# Patient Record
Sex: MALE | Race: WHITE | HISPANIC OR LATINO | URBAN - METROPOLITAN AREA
[De-identification: names, ages, dates, MRNs, and addresses within clinical notes are randomized per-mention and may not be internally consistent; named-entity substitution may affect disease eponyms.]

---

## 2020-11-22 ENCOUNTER — OUTPATIENT (OUTPATIENT)
Dept: OUTPATIENT SERVICES | Facility: HOSPITAL | Age: 75
LOS: 1 days | End: 2020-11-22
Payer: MEDICARE

## 2020-11-22 DIAGNOSIS — Z11.59 ENCOUNTER FOR SCREENING FOR OTHER VIRAL DISEASES: ICD-10-CM

## 2020-11-22 PROCEDURE — U0003: CPT

## 2020-11-23 DIAGNOSIS — Z11.59 ENCOUNTER FOR SCREENING FOR OTHER VIRAL DISEASES: ICD-10-CM

## 2020-11-23 LAB — SARS-COV-2 RNA SPEC QL NAA+PROBE: SIGNIFICANT CHANGE UP

## 2023-08-15 ENCOUNTER — EMERGENCY (EMERGENCY)
Facility: HOSPITAL | Age: 78
LOS: 0 days | Discharge: HOME | End: 2023-08-15
Attending: EMERGENCY MEDICINE
Payer: MEDICARE

## 2023-08-15 VITALS
RESPIRATION RATE: 18 BRPM | HEART RATE: 72 BPM | TEMPERATURE: 98 F | OXYGEN SATURATION: 100 % | DIASTOLIC BLOOD PRESSURE: 82 MMHG | SYSTOLIC BLOOD PRESSURE: 133 MMHG

## 2023-08-15 VITALS
DIASTOLIC BLOOD PRESSURE: 80 MMHG | HEART RATE: 65 BPM | RESPIRATION RATE: 13 BRPM | SYSTOLIC BLOOD PRESSURE: 133 MMHG | OXYGEN SATURATION: 100 % | TEMPERATURE: 98 F

## 2023-08-15 DIAGNOSIS — T82.838A HEMORRHAGE DUE TO VASCULAR PROSTHETIC DEVICES, IMPLANTS AND GRAFTS, INITIAL ENCOUNTER: ICD-10-CM

## 2023-08-15 DIAGNOSIS — Z95.1 PRESENCE OF AORTOCORONARY BYPASS GRAFT: Chronic | ICD-10-CM
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
## 2023-08-15 DIAGNOSIS — Z95.1 PRESENCE OF AORTOCORONARY BYPASS GRAFT: ICD-10-CM

## 2023-08-15 DIAGNOSIS — I25.10 ATHEROSCLEROTIC HEART DISEASE OF NATIVE CORONARY ARTERY WITHOUT ANGINA PECTORIS: ICD-10-CM
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
## 2023-08-15 LAB
ALBUMIN SERPL ELPH-MCNC: 3.5 G/DL — SIGNIFICANT CHANGE UP (ref 3.3–5)
ALP SERPL-CCNC: 40 U/L — SIGNIFICANT CHANGE UP (ref 40–120)
ALT FLD-CCNC: 33 U/L — SIGNIFICANT CHANGE UP (ref 12–78)
ANION GAP SERPL CALC-SCNC: 4 MMOL/L — LOW (ref 5–17)
APPEARANCE UR: CLEAR — SIGNIFICANT CHANGE UP
APTT BLD: 26.9 SEC — SIGNIFICANT CHANGE UP (ref 24.5–35.6)
AST SERPL-CCNC: 31 U/L — SIGNIFICANT CHANGE UP (ref 15–37)
BASOPHILS # BLD AUTO: 0.05 K/UL — SIGNIFICANT CHANGE UP (ref 0–0.2)
BASOPHILS NFR BLD AUTO: 1 % — SIGNIFICANT CHANGE UP (ref 0–2)
BILIRUB SERPL-MCNC: 0.5 MG/DL — SIGNIFICANT CHANGE UP (ref 0.2–1.2)
BILIRUB UR-MCNC: NEGATIVE — SIGNIFICANT CHANGE UP
BLD GP AB SCN SERPL QL: SIGNIFICANT CHANGE UP
BUN SERPL-MCNC: 16 MG/DL — SIGNIFICANT CHANGE UP (ref 7–23)
CALCIUM SERPL-MCNC: 8.6 MG/DL — SIGNIFICANT CHANGE UP (ref 8.5–10.1)
CHLORIDE SERPL-SCNC: 109 MMOL/L — HIGH (ref 96–108)
CO2 SERPL-SCNC: 25 MMOL/L — SIGNIFICANT CHANGE UP (ref 22–31)
COLOR SPEC: YELLOW — SIGNIFICANT CHANGE UP
CREAT SERPL-MCNC: 0.85 MG/DL — SIGNIFICANT CHANGE UP (ref 0.5–1.3)
DIFF PNL FLD: NEGATIVE — SIGNIFICANT CHANGE UP
EGFR: 90 ML/MIN/1.73M2 — SIGNIFICANT CHANGE UP
EOSINOPHIL # BLD AUTO: 0.26 K/UL — SIGNIFICANT CHANGE UP (ref 0–0.5)
EOSINOPHIL NFR BLD AUTO: 5.2 % — SIGNIFICANT CHANGE UP (ref 0–6)
GLUCOSE SERPL-MCNC: 102 MG/DL — HIGH (ref 70–99)
GLUCOSE UR QL: NEGATIVE — SIGNIFICANT CHANGE UP
HCT VFR BLD CALC: 42.9 % — SIGNIFICANT CHANGE UP (ref 39–50)
HGB BLD-MCNC: 15.1 G/DL — SIGNIFICANT CHANGE UP (ref 13–17)
IMM GRANULOCYTES NFR BLD AUTO: 0.2 % — SIGNIFICANT CHANGE UP (ref 0–0.9)
INR BLD: 1.03 RATIO — SIGNIFICANT CHANGE UP (ref 0.85–1.18)
KETONES UR-MCNC: NEGATIVE — SIGNIFICANT CHANGE UP
LEUKOCYTE ESTERASE UR-ACNC: NEGATIVE — SIGNIFICANT CHANGE UP
LYMPHOCYTES # BLD AUTO: 1.47 K/UL — SIGNIFICANT CHANGE UP (ref 1–3.3)
LYMPHOCYTES # BLD AUTO: 29.6 % — SIGNIFICANT CHANGE UP (ref 13–44)
MCHC RBC-ENTMCNC: 30.8 PG — SIGNIFICANT CHANGE UP (ref 27–34)
MCHC RBC-ENTMCNC: 35.2 GM/DL — SIGNIFICANT CHANGE UP (ref 32–36)
MCV RBC AUTO: 87.6 FL — SIGNIFICANT CHANGE UP (ref 80–100)
MONOCYTES # BLD AUTO: 0.68 K/UL — SIGNIFICANT CHANGE UP (ref 0–0.9)
MONOCYTES NFR BLD AUTO: 13.7 % — SIGNIFICANT CHANGE UP (ref 2–14)
NEUTROPHILS # BLD AUTO: 2.49 K/UL — SIGNIFICANT CHANGE UP (ref 1.8–7.4)
NEUTROPHILS NFR BLD AUTO: 50.3 % — SIGNIFICANT CHANGE UP (ref 43–77)
NITRITE UR-MCNC: NEGATIVE — SIGNIFICANT CHANGE UP
PH UR: 7 — SIGNIFICANT CHANGE UP (ref 5–8)
PLATELET # BLD AUTO: 201 K/UL — SIGNIFICANT CHANGE UP (ref 150–400)
POTASSIUM SERPL-MCNC: 4 MMOL/L — SIGNIFICANT CHANGE UP (ref 3.5–5.3)
POTASSIUM SERPL-SCNC: 4 MMOL/L — SIGNIFICANT CHANGE UP (ref 3.5–5.3)
PROT SERPL-MCNC: 6.1 GM/DL — SIGNIFICANT CHANGE UP (ref 6–8.3)
PROT UR-MCNC: NEGATIVE — SIGNIFICANT CHANGE UP
PROTHROM AB SERPL-ACNC: 11.6 SEC — SIGNIFICANT CHANGE UP (ref 9.5–13)
RBC # BLD: 4.9 M/UL — SIGNIFICANT CHANGE UP (ref 4.2–5.8)
RBC # FLD: 15.5 % — HIGH (ref 10.3–14.5)
SODIUM SERPL-SCNC: 138 MMOL/L — SIGNIFICANT CHANGE UP (ref 135–145)
SP GR SPEC: 1.01 — SIGNIFICANT CHANGE UP (ref 1.01–1.02)
UROBILINOGEN FLD QL: NEGATIVE — SIGNIFICANT CHANGE UP
WBC # BLD: 4.96 K/UL — SIGNIFICANT CHANGE UP (ref 3.8–10.5)
WBC # FLD AUTO: 4.96 K/UL — SIGNIFICANT CHANGE UP (ref 3.8–10.5)

## 2023-08-15 PROCEDURE — 85610 PROTHROMBIN TIME: CPT

## 2023-08-15 PROCEDURE — 99284 EMERGENCY DEPT VISIT MOD MDM: CPT

## 2023-08-15 PROCEDURE — 93926 LOWER EXTREMITY STUDY: CPT | Mod: 26,RT

## 2023-08-15 PROCEDURE — 93926 LOWER EXTREMITY STUDY: CPT | Mod: RT

## 2023-08-15 PROCEDURE — 85730 THROMBOPLASTIN TIME PARTIAL: CPT

## 2023-08-15 PROCEDURE — 86901 BLOOD TYPING SEROLOGIC RH(D): CPT

## 2023-08-15 PROCEDURE — 99284 EMERGENCY DEPT VISIT MOD MDM: CPT | Mod: 25

## 2023-08-15 PROCEDURE — 36415 COLL VENOUS BLD VENIPUNCTURE: CPT

## 2023-08-15 PROCEDURE — 86900 BLOOD TYPING SEROLOGIC ABO: CPT

## 2023-08-15 PROCEDURE — 80053 COMPREHEN METABOLIC PANEL: CPT

## 2023-08-15 PROCEDURE — 81003 URINALYSIS AUTO W/O SCOPE: CPT

## 2023-08-15 PROCEDURE — 86850 RBC ANTIBODY SCREEN: CPT

## 2023-08-15 PROCEDURE — 85025 COMPLETE CBC W/AUTO DIFF WBC: CPT

## 2023-08-15 NOTE — ED ADULT NURSE NOTE - AS PAIN REST
0 (no pain/absence of nonverbal indicators of pain) Propranolol Pregnancy And Lactation Text: This medication is Pregnancy Category C and it isn't known if it is safe during pregnancy. It is excreted in breast milk.

## 2023-08-15 NOTE — ED PROVIDER NOTE - OBJECTIVE STATEMENT
78M hx CAD s/p CABGx4 in March, cardiac cath with stents x2 done yesterday at [facility] in [facility] (cath by Dr Larson), p/w bleeding from R groin cath site. Pt reports noticed bleeding around midnight, called service who rec pressure to area and to come to ED if bleeding returned. Bleeding returned this am, not brisk per pt. 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 No truama, denies CP/SOB

## 2023-08-15 NOTE — ED PROVIDER NOTE - PATIENT PORTAL LINK FT
You can access the FollowMyHealth Patient Portal offered by [facility] by registering at the following website: [email or web address]. By joining [facility]’s FollowMyHealth portal, you will also be able to view your health information using other applications (apps) compatible with our system.

## 2023-08-15 NOTE — ED ADULT NURSE NOTE - NSFALLHARMRISKINTERV_ED_ALL_ED

## 2023-08-15 NOTE — ED ADULT NURSE NOTE - OBJECTIVE STATEMENT
Pt is AOx4 from home c/o bleeding at surgical site from right femoral catheterization yesterday at [facility] for two stent placement. Pt states he called cardiologist who instructed him to come to the ED if bleeding continued. Pt is able to move RLE and denies increase in numbness/tingling. Pt denies chest pain, sob, numbness/tingling, change in gait or blurred vision.

## 2023-08-15 NOTE — ED ADULT TRIAGE NOTE - CHIEF COMPLAINT QUOTE
pt presents to the ED for bleeding at the site of his R groin. as per pt he had cardiac catheterization for bypass yesterday. denies chest pain and SOB. pt reports the site on his R groin is still bleeding. no complaints of chest pain or SOB. pt is well appearing. A&Ox4. no further complaints

## 2023-08-15 NOTE — ED ADULT NURSE NOTE - CAS ELECT INFOMATION PROVIDED
Pt D/C instruction was given to patient. Pt verbally stated he understood instructions. VSS pt denies SOB, chest  pain. Pt refused wheelchair for discharge/DC instructions

## 2023-08-15 NOTE — ED PROVIDER NOTE - NSFOLLOWUPINSTRUCTIONS_ED_ALL_ED_FT
Return to the Emergency Department for worsening or persistent symptoms, and/or ANY NEW OR CONCERNING SYMPTOMS. If you have issues obtaining follow up, please call: [phone] or [phone]  to obtain a doctor or specialist who takes your insurance in your area.    Coronary Angiogram, Care After  This sheet gives you information about how to care for yourself after your procedure. Your health care provider may also give you more specific instructions. If you have problems or questions, contact your health care provider.    What can I expect after the procedure?  After the procedure, it is common to have:  Bruising and tenderness at the catheter insertion area.  A collection of blood (hematoma) at the insertion area. This may feel like a small lump under the skin.  Follow these instructions at home:  Insertion site care      Follow instructions from your health care provider about how to take care of your insertion site. Make sure you:  Wash your hands with soap and water before and after you change your bandage (dressing). If soap and water are not available, use hand .  Change your dressing as told by your health care provider.  Do not take baths, swim, or use a hot tub until your health care provider approves.  You may shower 24–48 hours after the procedure, or as told by your health care provider. To clean the insertion site:  Gently wash the area with plain soap and water.  Pat the area dry with a clean towel.  Do not rub the site. This may cause bleeding.  Keep the site clean and dry. Do not apply powder or lotion.  Check your insertion site every day for signs of infection. Check for:  Redness, swelling, or pain.  Fluid or blood.  Warmth.  Pus or a bad smell.  Activity    Do not drive for 24 hours if you were given a sedative during your procedure.  Rest as told by your health care provider. You may be asked to rest for 1–2 days.  Do not lift anything that is heavier than 10 lb (4.5 kg), or the limit that you are told, until your health care provider says that it is safe.  If your insertion site was in your leg, try to avoid stairs for a few days.  Return to your normal activities as told by your health care provider, usually in about a week. Ask your health care provider what activities are safe for you.  General instructions      If your insertion site starts bleeding, lie flat and put pressure on the site. If the bleeding does not stop, get help right away. This is a medical emergency.  Take over-the-counter and prescription medicines only as told by your health care provider.  Drink enough fluid to keep your urine pale yellow. This helps to flush the contrast dye from your body.  Keep all follow-up visits as told by your health care provider. This is important.  Contact a health care provider if:  You have a fever or chills.  You have redness, swelling, or pain around your insertion site.  You have fluid or blood coming from your insertion site.  Your insertion site feels warm to the touch.  You have pus or a bad smell coming from your insertion site.  You have more bruising around the insertion site.  Get help right away if:  You have problems in the insertion site:  You have severe pain, rapid swelling, or bleeding that does not stop when pressure is applied.  The insertion site becomes pale, cool, tingly, or numb.  You have chest pain.  You have trouble breathing.  You have a rash.  Any symptoms of a stroke. "BE FAST" is an easy way to remember the main warning signs:  B - Balance. Signs are dizziness, sudden trouble walking, or loss of balance.  E - Eyes. Signs are trouble seeing or a sudden change in how you see.  F - Face. Signs are sudden weakness or loss of feeling in the face, or the face or eyelid drooping on one side.  A - Arms. Signs are weakness or loss of feeling in an arm. This happens suddenly and usually on one side of the body.  S - Speech. Signs are sudden trouble speaking, slurred speech, or trouble understanding what people say.  T - Time. Time to call emergency services. Write down what time symptoms started.  You have other signs of a stroke, such as:  A sudden, severe headache with no known cause.  Nausea or vomiting.  Seizure.  These symptoms may represent a serious problem that is an emergency. Do not wait to see if the symptoms will go away. Get medical help right away. Call your local emergency services (911 in the [place] Do not drive yourself to the hospital.    Summary  It is common to have bruising and tenderness at the catheter insertion area.  Do not take baths, swim, or use a hot tub until your health care provider approves. You may shower 24–48 hours after the procedure or as told.  It is important to rest and drink plenty of fluids.  If the insertion site bleeds, lie flat and put pressure on the site. If the bleeding continues, get help right away. This is a medical emergency.  This information is not intended to replace advice given to you by your health care provider. Make sure you discuss any questions you have with your health care provider.

## 2023-10-14 ENCOUNTER — EMERGENCY (EMERGENCY)
Facility: HOSPITAL | Age: 78
LOS: 0 days | Discharge: HOME | End: 2023-10-14
Attending: STUDENT IN AN ORGANIZED HEALTH CARE EDUCATION/TRAINING PROGRAM
Payer: MEDICARE

## 2023-10-14 VITALS — HEIGHT: 74 IN | WEIGHT: 167.99 LBS

## 2023-10-14 VITALS
HEART RATE: 74 BPM | TEMPERATURE: 98 F | SYSTOLIC BLOOD PRESSURE: 130 MMHG | OXYGEN SATURATION: 100 % | DIASTOLIC BLOOD PRESSURE: 83 MMHG | RESPIRATION RATE: 19 BRPM

## 2023-10-14 DIAGNOSIS — Z95.1 PRESENCE OF AORTOCORONARY BYPASS GRAFT: Chronic | ICD-10-CM

## 2023-10-14 DIAGNOSIS — I25.10 ATHEROSCLEROTIC HEART DISEASE OF NATIVE CORONARY ARTERY WITHOUT ANGINA PECTORIS: ICD-10-CM

## 2023-10-14 DIAGNOSIS — R10.31 RIGHT LOWER QUADRANT PAIN: ICD-10-CM

## 2023-10-14 DIAGNOSIS — Z95.5 PRESENCE OF CORONARY ANGIOPLASTY IMPLANT AND GRAFT: ICD-10-CM

## 2023-10-14 DIAGNOSIS — Z95.1 PRESENCE OF AORTOCORONARY BYPASS GRAFT: ICD-10-CM

## 2023-10-14 LAB
ALBUMIN SERPL ELPH-MCNC: 3.7 G/DL — SIGNIFICANT CHANGE UP (ref 3.3–5)
ALP SERPL-CCNC: 54 U/L — SIGNIFICANT CHANGE UP (ref 40–120)
ALT FLD-CCNC: 35 U/L — SIGNIFICANT CHANGE UP (ref 12–78)
ANION GAP SERPL CALC-SCNC: 4 MMOL/L — LOW (ref 5–17)
AST SERPL-CCNC: 31 U/L — SIGNIFICANT CHANGE UP (ref 15–37)
BASOPHILS # BLD AUTO: 0.06 K/UL — SIGNIFICANT CHANGE UP (ref 0–0.2)
BASOPHILS NFR BLD AUTO: 0.9 % — SIGNIFICANT CHANGE UP (ref 0–2)
BILIRUB SERPL-MCNC: 0.6 MG/DL — SIGNIFICANT CHANGE UP (ref 0.2–1.2)
BUN SERPL-MCNC: 15 MG/DL — SIGNIFICANT CHANGE UP (ref 7–23)
CALCIUM SERPL-MCNC: 9.1 MG/DL — SIGNIFICANT CHANGE UP (ref 8.5–10.1)
CHLORIDE SERPL-SCNC: 106 MMOL/L — SIGNIFICANT CHANGE UP (ref 96–108)
CO2 SERPL-SCNC: 28 MMOL/L — SIGNIFICANT CHANGE UP (ref 22–31)
CREAT SERPL-MCNC: 0.88 MG/DL — SIGNIFICANT CHANGE UP (ref 0.5–1.3)
EGFR: 89 ML/MIN/1.73M2 — SIGNIFICANT CHANGE UP
EOSINOPHIL # BLD AUTO: 0.23 K/UL — SIGNIFICANT CHANGE UP (ref 0–0.5)
EOSINOPHIL NFR BLD AUTO: 3.4 % — SIGNIFICANT CHANGE UP (ref 0–6)
GLUCOSE SERPL-MCNC: 88 MG/DL — SIGNIFICANT CHANGE UP (ref 70–99)
HCT VFR BLD CALC: 45.8 % — SIGNIFICANT CHANGE UP (ref 39–50)
HGB BLD-MCNC: 15.7 G/DL — SIGNIFICANT CHANGE UP (ref 13–17)
IMM GRANULOCYTES NFR BLD AUTO: 0.3 % — SIGNIFICANT CHANGE UP (ref 0–0.9)
LYMPHOCYTES # BLD AUTO: 1.63 K/UL — SIGNIFICANT CHANGE UP (ref 1–3.3)
LYMPHOCYTES # BLD AUTO: 24.3 % — SIGNIFICANT CHANGE UP (ref 13–44)
MCHC RBC-ENTMCNC: 31.4 PG — SIGNIFICANT CHANGE UP (ref 27–34)
MCHC RBC-ENTMCNC: 34.3 GM/DL — SIGNIFICANT CHANGE UP (ref 32–36)
MCV RBC AUTO: 91.6 FL — SIGNIFICANT CHANGE UP (ref 80–100)
MONOCYTES # BLD AUTO: 0.65 K/UL — SIGNIFICANT CHANGE UP (ref 0–0.9)
MONOCYTES NFR BLD AUTO: 9.7 % — SIGNIFICANT CHANGE UP (ref 2–14)
NEUTROPHILS # BLD AUTO: 4.12 K/UL — SIGNIFICANT CHANGE UP (ref 1.8–7.4)
NEUTROPHILS NFR BLD AUTO: 61.4 % — SIGNIFICANT CHANGE UP (ref 43–77)
PLATELET # BLD AUTO: 219 K/UL — SIGNIFICANT CHANGE UP (ref 150–400)
POTASSIUM SERPL-MCNC: 4.1 MMOL/L — SIGNIFICANT CHANGE UP (ref 3.5–5.3)
POTASSIUM SERPL-SCNC: 4.1 MMOL/L — SIGNIFICANT CHANGE UP (ref 3.5–5.3)
PROT SERPL-MCNC: 6.8 GM/DL — SIGNIFICANT CHANGE UP (ref 6–8.3)
RBC # BLD: 5 M/UL — SIGNIFICANT CHANGE UP (ref 4.2–5.8)
RBC # FLD: 13.4 % — SIGNIFICANT CHANGE UP (ref 10.3–14.5)
SODIUM SERPL-SCNC: 138 MMOL/L — SIGNIFICANT CHANGE UP (ref 135–145)
WBC # BLD: 6.71 K/UL — SIGNIFICANT CHANGE UP (ref 3.8–10.5)
WBC # FLD AUTO: 6.71 K/UL — SIGNIFICANT CHANGE UP (ref 3.8–10.5)

## 2023-10-14 PROCEDURE — 99285 EMERGENCY DEPT VISIT HI MDM: CPT | Mod: FS

## 2023-10-14 PROCEDURE — 85025 COMPLETE CBC W/AUTO DIFF WBC: CPT

## 2023-10-14 PROCEDURE — 74174 CTA ABD&PLVS W/CONTRAST: CPT | Mod: MA

## 2023-10-14 PROCEDURE — 93926 LOWER EXTREMITY STUDY: CPT | Mod: RT

## 2023-10-14 PROCEDURE — 93926 LOWER EXTREMITY STUDY: CPT | Mod: 26,RT

## 2023-10-14 PROCEDURE — 74174 CTA ABD&PLVS W/CONTRAST: CPT | Mod: 26,MA

## 2023-10-14 PROCEDURE — 99285 EMERGENCY DEPT VISIT HI MDM: CPT | Mod: 25

## 2023-10-14 PROCEDURE — 36415 COLL VENOUS BLD VENIPUNCTURE: CPT

## 2023-10-14 PROCEDURE — 80053 COMPREHEN METABOLIC PANEL: CPT

## 2023-10-14 NOTE — ED STATDOCS - OBJECTIVE STATEMENT
79 y/o male w/ a PMHx of stented CAD x4 and pseudoaneurysm presents to the ED sent in by Dr. Retana for wound check. Pt is s/p cath w/ Dr. Retana on 9/19 through his right groin, pt sent in by Dr. Retana for persistent swelling. No other complaints at this time. 77 y/o male w/ a PMHx of stented CAD x4 and pseudoaneurysm presents to the ED sent in by Dr. Retana for wound check. Pt is s/p cath w/ Dr. Retana on 9/19 through his right groin, pt sent in by Dr. Retana for persistent swelling. No other complaints at this time. on a blood thinner, unable to provide further informatiojn  No fever, numbness, weakness, pain, vomiting, diarrhea

## 2023-10-14 NOTE — ED STATDOCS - ATTENDING APP SHARED VISIT CONTRIBUTION OF CARE
Dr. Agrawal: I performed a face to face bedside interview with patient regarding history of present illness, review of symptoms and past medical history. I completed an independent physical exam.  I have discussed patient's plan of care with PA.   I agree with note as stated above, having amended the EMR as needed to reflect my findings.   This includes HISTORY OF PRESENT ILLNESS, HIV, PAST MEDICAL/SURGICAL/FAMILY/SOCIAL HISTORY, ALLERGIES AND HOME MEDICATIONS, REVIEW OF SYSTEMS, PHYSICAL EXAM, and any PROGRESS NOTES during the time I functioned as the attending physician for this patient.

## 2023-10-14 NOTE — ED ADULT NURSE NOTE - OBJECTIVE STATEMENT
77 y/o male w/ a PMHx of stented CAD x4 and pseudoaneurysm presents to the ED sent in by Dr. Retana for wound check. Pt is s/p cath w/ Dr. Retana on 9/19 through his right groin, pt sent in by Dr. Retana for persistent swelling. No other complaints at this time. on a blood thinner, unable to provide further information denies fever, numbness, weakness, pain, vomiting, diarrhea, chills, chest pain and SOB

## 2023-10-14 NOTE — ED STATDOCS - NSFOLLOWUPINSTRUCTIONS_ED_ALL_ED_FT
Follow up with Dr. Retana on Monday. Take Motrin and/or Tylenol for pain.     Return to the Emergency Department for worsening or persistent symptoms, and/or ANY NEW OR CONCERNING SYMPTOMS. If you have issues obtaining follow up, please call: [phone] or [phone]  to obtain a doctor or specialist who takes your insurance in your area.

## 2023-10-14 NOTE — ED STATDOCS - PHYSICAL EXAMINATION
Vital signs reviewed  GENERAL: Patient nontoxic appearing, NAD  HEAD: NCAT  EYES: Anicteric  ENT: MMM  NECK: Supple, non tender  RESPIRATORY: Normal respiratory effort. CTA B/L. No wheezing, rales, rhonchi  CARDIOVASCULAR: Regular rate and rhythm  ABDOMEN: Soft. Nondistended. Nontender. No guarding or rebound. No CVA tenderness.  MUSCULOSKELETAL/EXTREMITIES: Right groin w/ 3x3cm area, palpable, immobile mass, femoral pulse 2+, sensation intact, muscle strength normal. Brisk cap refill. 2+ radial pulses. No leg edema.  SKIN:  Warm and dry  NEURO: AAOx3. No gross FND.  PSYCHIATRIC: Cooperative. Affect appropriate. Vital signs reviewed  GENERAL: Patient nontoxic appearing, NAD  HEAD: NCAT  EYES: Anicteric  ENT: MMM  NECK: Supple, non tender  RESPIRATORY: Normal respiratory effort. CTA B/L. No wheezing, rales, rhonchi  CARDIOVASCULAR: Regular rate and rhythm  ABDOMEN: Soft. Nondistended. Nontender. No guarding or rebound. No CVA tenderness.  MUSCULOSKELETAL/EXTREMITIES: Right groin w/ 3x3cm area, palpable, immobile mass, nonpulsatile  femoral pulse 2+, sensation intact, muscle strength normal. Brisk cap refill. 2+ radial pulses. No leg edema.  no redness, erythema, warmth   SKIN:  Warm and dry  NEURO: AAOx3. No gross FND.  PSYCHIATRIC: Cooperative. Affect appropriate.

## 2023-10-14 NOTE — ED ADULT NURSE NOTE - NSFALLUNIVINTERV_ED_ALL_ED
Bed/Stretcher in lowest position, wheels locked, appropriate side rails in place/Call bell, personal items and telephone in reach/Instruct patient to call for assistance before getting out of bed/chair/stretcher/Non-slip footwear applied when patient is off stretcher/[place] to call system/Physically safe environment - no spills, clutter or unnecessary equipment/Purposeful proactive rounding/Room/bathroom lighting operational, light cord in reach

## 2023-10-14 NOTE — ED STATDOCS - PROGRESS NOTE DETAILS
77 y/o male s/p right femoral cath presents to ED c/o swelling over right femoral artery. Likely hematoma, will follow ultrasound r/o pseudoaneurysm. - Coty Rodriguez PA-C Ultrasound reviewed. Notes "Postprocedural changes of recent right groin vascular access including   poorly defined complex fluid and/or other soft tissue edema measuring up to 2.0 x 2.0 x 1.4 cm. No clear evidence of right CFA pseudoaneurysm." Recommends CTA for more clear evaluation. CTA ordered, d/w patient. Will follow CT and reeval. - Coty Rodriguez PA-C CTA showing post-procedural changes, no pseudoaneurysm, no hematoma, no active bleeding. D/w patient. Stable for dc. Notes he has an appt with Dr. Retana on Monday for follow up. Strict return precautions were given. All questions and concerns were addressed. - Coty Rodriguez PA-C

## 2023-10-14 NOTE — ED STATDOCS - PATIENT PORTAL LINK FT
You can access the FollowMyHealth Patient Portal offered by [facility] by registering at the following website: [email or web address]. By joining [facility]’s FollowMyHealth portal, you will also be able to view your health information using other applications (apps) compatible with our system.

## 2023-10-14 NOTE — ED ADULT TRIAGE NOTE - CHIEF COMPLAINT QUOTE
Pt presented to the ER with request of a wound check by Dr. Retana. Pt stated that he got a cath done on 09/19 in his right groin area. Pt stated that the area is swollen still. Dr. Retana request pt to get a US.

## 2023-10-14 NOTE — ED STATDOCS - CLINICAL SUMMARY MEDICAL DECISION MAKING FREE TEXT BOX
78-year-old male status post cath right femoral access presents to the ER for swelling over the right femoral artery.  Neurovascularly intact afebrile low suspicion for  cellulitis, abscess plan to evaluate ultrasound for pseudoaneurysm.  Likely hematoma, nonpulsatile low suspicion for active extrav

## 2023-10-15 PROBLEM — I25.10 ATHEROSCLEROTIC HEART DISEASE OF NATIVE CORONARY ARTERY WITHOUT ANGINA PECTORIS: Chronic | Status: ACTIVE | Noted: 2023-08-15

## 2024-09-07 ENCOUNTER — INPATIENT (INPATIENT)
Facility: HOSPITAL | Age: 79
LOS: 1 days | Discharge: HOME | DRG: 378 | End: 2024-09-09
Attending: FAMILY MEDICINE | Admitting: FAMILY MEDICINE
Payer: MEDICARE

## 2024-09-07 VITALS — WEIGHT: 178.13 LBS

## 2024-09-07 DIAGNOSIS — Z95.1 PRESENCE OF AORTOCORONARY BYPASS GRAFT: ICD-10-CM

## 2024-09-07 DIAGNOSIS — Z95.1 PRESENCE OF AORTOCORONARY BYPASS GRAFT: Chronic | ICD-10-CM

## 2024-09-07 LAB
ALBUMIN SERPL ELPH-MCNC: 3.2 G/DL — LOW (ref 3.3–5)
ALP SERPL-CCNC: 30 U/L — LOW (ref 40–120)
ALT FLD-CCNC: 24 U/L — SIGNIFICANT CHANGE UP (ref 12–78)
ANION GAP SERPL CALC-SCNC: 3 MMOL/L — LOW (ref 5–17)
APPEARANCE UR: CLEAR — SIGNIFICANT CHANGE UP
APTT BLD: 23.4 SEC — LOW (ref 24.5–35.6)
AST SERPL-CCNC: 20 U/L — SIGNIFICANT CHANGE UP (ref 15–37)
BASOPHILS # BLD AUTO: 0.03 K/UL — SIGNIFICANT CHANGE UP (ref 0–0.2)
BASOPHILS # BLD AUTO: 0.04 K/UL — SIGNIFICANT CHANGE UP (ref 0–0.2)
BASOPHILS NFR BLD AUTO: 0.5 % — SIGNIFICANT CHANGE UP (ref 0–2)
BASOPHILS NFR BLD AUTO: 0.7 % — SIGNIFICANT CHANGE UP (ref 0–2)
BILIRUB SERPL-MCNC: 0.3 MG/DL — SIGNIFICANT CHANGE UP (ref 0.2–1.2)
BILIRUB UR-MCNC: NEGATIVE — SIGNIFICANT CHANGE UP
BLD GP AB SCN SERPL QL: SIGNIFICANT CHANGE UP
BUN SERPL-MCNC: 40 MG/DL — HIGH (ref 7–23)
CALCIUM SERPL-MCNC: 8.5 MG/DL — SIGNIFICANT CHANGE UP (ref 8.5–10.1)
CHLORIDE SERPL-SCNC: 109 MMOL/L — HIGH (ref 96–108)
CO2 SERPL-SCNC: 25 MMOL/L — SIGNIFICANT CHANGE UP (ref 22–31)
COLOR SPEC: YELLOW — SIGNIFICANT CHANGE UP
CREAT SERPL-MCNC: 0.78 MG/DL — SIGNIFICANT CHANGE UP (ref 0.5–1.3)
DIFF PNL FLD: NEGATIVE — SIGNIFICANT CHANGE UP
EGFR: 92 ML/MIN/1.73M2 — SIGNIFICANT CHANGE UP
EOSINOPHIL # BLD AUTO: 0.04 K/UL — SIGNIFICANT CHANGE UP (ref 0–0.5)
EOSINOPHIL # BLD AUTO: 0.08 K/UL — SIGNIFICANT CHANGE UP (ref 0–0.5)
EOSINOPHIL NFR BLD AUTO: 0.6 % — SIGNIFICANT CHANGE UP (ref 0–6)
EOSINOPHIL NFR BLD AUTO: 1.3 % — SIGNIFICANT CHANGE UP (ref 0–6)
FLUAV AG NPH QL: SIGNIFICANT CHANGE UP
FLUBV AG NPH QL: SIGNIFICANT CHANGE UP
GLUCOSE SERPL-MCNC: 113 MG/DL — HIGH (ref 70–99)
GLUCOSE UR QL: NEGATIVE MG/DL — SIGNIFICANT CHANGE UP
HCT VFR BLD CALC: 27.7 % — LOW (ref 39–50)
HCT VFR BLD CALC: 29.3 % — LOW (ref 39–50)
HGB BLD-MCNC: 9.4 G/DL — LOW (ref 13–17)
HGB BLD-MCNC: 9.9 G/DL — LOW (ref 13–17)
IMM GRANULOCYTES NFR BLD AUTO: 0.2 % — SIGNIFICANT CHANGE UP (ref 0–0.9)
IMM GRANULOCYTES NFR BLD AUTO: 0.6 % — SIGNIFICANT CHANGE UP (ref 0–0.9)
INR BLD: 1.02 RATIO — SIGNIFICANT CHANGE UP (ref 0.85–1.18)
KETONES UR-MCNC: NEGATIVE MG/DL — SIGNIFICANT CHANGE UP
LEUKOCYTE ESTERASE UR-ACNC: NEGATIVE — SIGNIFICANT CHANGE UP
LYMPHOCYTES # BLD AUTO: 1.44 K/UL — SIGNIFICANT CHANGE UP (ref 1–3.3)
LYMPHOCYTES # BLD AUTO: 1.92 K/UL — SIGNIFICANT CHANGE UP (ref 1–3.3)
LYMPHOCYTES # BLD AUTO: 22 % — SIGNIFICANT CHANGE UP (ref 13–44)
LYMPHOCYTES # BLD AUTO: 31.9 % — SIGNIFICANT CHANGE UP (ref 13–44)
MAGNESIUM SERPL-MCNC: 2.2 MG/DL — SIGNIFICANT CHANGE UP (ref 1.6–2.6)
MCHC RBC-ENTMCNC: 32.1 PG — SIGNIFICANT CHANGE UP (ref 27–34)
MCHC RBC-ENTMCNC: 32.4 PG — SIGNIFICANT CHANGE UP (ref 27–34)
MCHC RBC-ENTMCNC: 33.8 GM/DL — SIGNIFICANT CHANGE UP (ref 32–36)
MCHC RBC-ENTMCNC: 33.9 GM/DL — SIGNIFICANT CHANGE UP (ref 32–36)
MCV RBC AUTO: 94.5 FL — SIGNIFICANT CHANGE UP (ref 80–100)
MCV RBC AUTO: 95.8 FL — SIGNIFICANT CHANGE UP (ref 80–100)
MONOCYTES # BLD AUTO: 0.38 K/UL — SIGNIFICANT CHANGE UP (ref 0–0.9)
MONOCYTES # BLD AUTO: 0.6 K/UL — SIGNIFICANT CHANGE UP (ref 0–0.9)
MONOCYTES NFR BLD AUTO: 6.3 % — SIGNIFICANT CHANGE UP (ref 2–14)
MONOCYTES NFR BLD AUTO: 9.2 % — SIGNIFICANT CHANGE UP (ref 2–14)
NEUTROPHILS # BLD AUTO: 3.59 K/UL — SIGNIFICANT CHANGE UP (ref 1.8–7.4)
NEUTROPHILS # BLD AUTO: 4.39 K/UL — SIGNIFICANT CHANGE UP (ref 1.8–7.4)
NEUTROPHILS NFR BLD AUTO: 59.6 % — SIGNIFICANT CHANGE UP (ref 43–77)
NEUTROPHILS NFR BLD AUTO: 67.1 % — SIGNIFICANT CHANGE UP (ref 43–77)
NITRITE UR-MCNC: NEGATIVE — SIGNIFICANT CHANGE UP
PH UR: 5 — SIGNIFICANT CHANGE UP (ref 5–8)
PLATELET # BLD AUTO: 161 K/UL — SIGNIFICANT CHANGE UP (ref 150–400)
PLATELET # BLD AUTO: 166 K/UL — SIGNIFICANT CHANGE UP (ref 150–400)
POTASSIUM SERPL-MCNC: 4 MMOL/L — SIGNIFICANT CHANGE UP (ref 3.5–5.3)
POTASSIUM SERPL-SCNC: 4 MMOL/L — SIGNIFICANT CHANGE UP (ref 3.5–5.3)
PROT SERPL-MCNC: 5.8 GM/DL — LOW (ref 6–8.3)
PROT UR-MCNC: NEGATIVE MG/DL — SIGNIFICANT CHANGE UP
PROTHROM AB SERPL-ACNC: 11.5 SEC — SIGNIFICANT CHANGE UP (ref 9.5–13)
RBC # BLD: 2.93 M/UL — LOW (ref 4.2–5.8)
RBC # BLD: 3.06 M/UL — LOW (ref 4.2–5.8)
RBC # FLD: 13.7 % — SIGNIFICANT CHANGE UP (ref 10.3–14.5)
RBC # FLD: 13.7 % — SIGNIFICANT CHANGE UP (ref 10.3–14.5)
RSV RNA NPH QL NAA+NON-PROBE: SIGNIFICANT CHANGE UP
SARS-COV-2 RNA SPEC QL NAA+PROBE: SIGNIFICANT CHANGE UP
SODIUM SERPL-SCNC: 137 MMOL/L — SIGNIFICANT CHANGE UP (ref 135–145)
SP GR SPEC: 1 — SIGNIFICANT CHANGE UP (ref 1–1.03)
TROPONIN I, HIGH SENSITIVITY RESULT: 12.78 NG/L — SIGNIFICANT CHANGE UP
TROPONIN I, HIGH SENSITIVITY RESULT: 13.5 NG/L — SIGNIFICANT CHANGE UP
UROBILINOGEN FLD QL: 0.2 MG/DL — SIGNIFICANT CHANGE UP (ref 0.2–1)
WBC # BLD: 6.02 K/UL — SIGNIFICANT CHANGE UP (ref 3.8–10.5)
WBC # BLD: 6.54 K/UL — SIGNIFICANT CHANGE UP (ref 3.8–10.5)
WBC # FLD AUTO: 6.02 K/UL — SIGNIFICANT CHANGE UP (ref 3.8–10.5)
WBC # FLD AUTO: 6.54 K/UL — SIGNIFICANT CHANGE UP (ref 3.8–10.5)

## 2024-09-07 PROCEDURE — 36415 COLL VENOUS BLD VENIPUNCTURE: CPT

## 2024-09-07 PROCEDURE — 85014 HEMATOCRIT: CPT

## 2024-09-07 PROCEDURE — 85018 HEMOGLOBIN: CPT

## 2024-09-07 PROCEDURE — 88305 TISSUE EXAM BY PATHOLOGIST: CPT

## 2024-09-07 PROCEDURE — 93010 ELECTROCARDIOGRAM REPORT: CPT

## 2024-09-07 PROCEDURE — 99222 1ST HOSP IP/OBS MODERATE 55: CPT

## 2024-09-07 PROCEDURE — 74178 CT ABD&PLV WO CNTR FLWD CNTR: CPT | Mod: 26,MC

## 2024-09-07 PROCEDURE — 84484 ASSAY OF TROPONIN QUANT: CPT

## 2024-09-07 PROCEDURE — 70450 CT HEAD/BRAIN W/O DYE: CPT | Mod: 26,MC

## 2024-09-07 PROCEDURE — 85027 COMPLETE CBC AUTOMATED: CPT

## 2024-09-07 PROCEDURE — 71045 X-RAY EXAM CHEST 1 VIEW: CPT | Mod: 26

## 2024-09-07 PROCEDURE — 99285 EMERGENCY DEPT VISIT HI MDM: CPT | Mod: FS

## 2024-09-07 PROCEDURE — 88312 SPECIAL STAINS GROUP 1: CPT

## 2024-09-07 PROCEDURE — 80048 BASIC METABOLIC PNL TOTAL CA: CPT

## 2024-09-07 PROCEDURE — 85025 COMPLETE CBC W/AUTO DIFF WBC: CPT

## 2024-09-07 RX ORDER — ROSUVASTATIN CALCIUM 10 MG/1
20 TABLET ORAL AT BEDTIME
Refills: 0 | Status: DISCONTINUED | OUTPATIENT
Start: 2024-09-07 | End: 2024-09-09

## 2024-09-07 RX ORDER — MAGNESIUM, ALUMINUM HYDROXIDE 200-225/5
30 SUSPENSION, ORAL (FINAL DOSE FORM) ORAL EVERY 4 HOURS
Refills: 0 | Status: DISCONTINUED | OUTPATIENT
Start: 2024-09-07 | End: 2024-09-09

## 2024-09-07 RX ORDER — ASPIRIN 81 MG
1 TABLET, DELAYED RELEASE (ENTERIC COATED) ORAL
Refills: 0 | DISCHARGE

## 2024-09-07 RX ORDER — ONDANSETRON 2 MG/ML
4 INJECTION, SOLUTION INTRAMUSCULAR; INTRAVENOUS EVERY 8 HOURS
Refills: 0 | Status: DISCONTINUED | OUTPATIENT
Start: 2024-09-07 | End: 2024-09-09

## 2024-09-07 RX ORDER — ACETAMINOPHEN 325 MG/1
650 TABLET ORAL EVERY 6 HOURS
Refills: 0 | Status: DISCONTINUED | OUTPATIENT
Start: 2024-09-07 | End: 2024-09-09

## 2024-09-07 RX ORDER — LIDOCAINE/BENZALKONIUM/ALCOHOL
1 SOLUTION, NON-ORAL TOPICAL ONCE
Refills: 0 | Status: COMPLETED | OUTPATIENT
Start: 2024-09-07 | End: 2024-09-07

## 2024-09-07 RX ORDER — PANTOPRAZOLE SODIUM 40 MG
8 TABLET, DELAYED RELEASE (ENTERIC COATED) ORAL
Qty: 80 | Refills: 0 | Status: DISCONTINUED | OUTPATIENT
Start: 2024-09-07 | End: 2024-09-09

## 2024-09-07 RX ORDER — ROSUVASTATIN CALCIUM 10 MG/1
1 TABLET ORAL
Refills: 0 | DISCHARGE

## 2024-09-07 RX ORDER — ALFUZOSIN HYDROCHLORIDE 10 MG/1
1 TABLET, EXTENDED RELEASE ORAL
Refills: 0 | DISCHARGE

## 2024-09-07 RX ORDER — GLUCOSAMINE/MSM/CHONDROITIN A 500-83-400
1 TABLET ORAL
Refills: 0 | DISCHARGE

## 2024-09-07 RX ORDER — LIDOCAINE/BENZALKONIUM/ALCOHOL
1 SOLUTION, NON-ORAL TOPICAL
Refills: 0 | DISCHARGE

## 2024-09-07 RX ORDER — L.ACIDOPH/B.ANIMALIS/B.LONGUM 15B CELL
1 CAPSULE ORAL
Refills: 0 | DISCHARGE

## 2024-09-07 RX ORDER — SODIUM CHLORIDE 9 MG/ML
1000 INJECTION INTRAMUSCULAR; INTRAVENOUS; SUBCUTANEOUS
Refills: 0 | Status: COMPLETED | OUTPATIENT
Start: 2024-09-07 | End: 2024-09-07

## 2024-09-07 RX ORDER — PANTOPRAZOLE SODIUM 40 MG
80 TABLET, DELAYED RELEASE (ENTERIC COATED) ORAL ONCE
Refills: 0 | Status: COMPLETED | OUTPATIENT
Start: 2024-09-07 | End: 2024-09-07

## 2024-09-07 RX ORDER — MAGNESIUM OXIDE TAB 400 MG (240 MG ELEMENTAL MG) 400 (240 MG) MG
400 TAB ORAL DAILY
Refills: 0 | Status: DISCONTINUED | OUTPATIENT
Start: 2024-09-07 | End: 2024-09-09

## 2024-09-07 RX ORDER — MAGNESIUM OXIDE TAB 400 MG (240 MG ELEMENTAL MG) 400 (240 MG) MG
1 TAB ORAL
Refills: 0 | DISCHARGE

## 2024-09-07 RX ORDER — SUCRALFATE 1 G/10ML
1 SUSPENSION ORAL
Refills: 0 | Status: DISCONTINUED | OUTPATIENT
Start: 2024-09-07 | End: 2024-09-09

## 2024-09-07 RX ORDER — SODIUM CHLORIDE 9 MG/ML
1000 INJECTION INTRAMUSCULAR; INTRAVENOUS; SUBCUTANEOUS ONCE
Refills: 0 | Status: COMPLETED | OUTPATIENT
Start: 2024-09-07 | End: 2024-09-07

## 2024-09-07 RX ORDER — CRANBERRY FRUIT EXTRACT 650 MG
1 CAPSULE ORAL
Refills: 0 | DISCHARGE

## 2024-09-07 RX ADMIN — Medication 80 MILLIGRAM(S): at 14:50

## 2024-09-07 RX ADMIN — SUCRALFATE 1 GRAM(S): 1 SUSPENSION ORAL at 21:06

## 2024-09-07 RX ADMIN — ROSUVASTATIN CALCIUM 20 MILLIGRAM(S): 10 TABLET ORAL at 21:06

## 2024-09-07 RX ADMIN — SODIUM CHLORIDE 100 MILLILITER(S): 9 INJECTION INTRAMUSCULAR; INTRAVENOUS; SUBCUTANEOUS at 21:14

## 2024-09-07 RX ADMIN — Medication 10 MG/HR: at 16:07

## 2024-09-07 RX ADMIN — Medication 10 MG/HR: at 16:00

## 2024-09-07 RX ADMIN — SODIUM CHLORIDE 1000 MILLILITER(S): 9 INJECTION INTRAMUSCULAR; INTRAVENOUS; SUBCUTANEOUS at 14:49

## 2024-09-07 RX ADMIN — Medication 1 PATCH: at 22:00

## 2024-09-07 RX ADMIN — SODIUM CHLORIDE 1000 MILLILITER(S): 9 INJECTION INTRAMUSCULAR; INTRAVENOUS; SUBCUTANEOUS at 16:09

## 2024-09-07 NOTE — ED ADULT NURSE NOTE - NSFALLHARMRISKINTERV_ED_ALL_ED
Assistance OOB with selected safe patient handling equipment if applicable/Assistance with ambulation/Communicate risk of Fall with Harm to all staff, patient, and family/Encourage patient to sit up slowly, dangle for a short time, stand at bedside before walking/Monitor gait and stability/Orthostatic vital signs/Provide visual cue: red socks, yellow wristband, yellow gown, etc/Reinforce activity limits and safety measures with patient and family/Bed in lowest position, wheels locked, appropriate side rails in place/Call bell, personal items and telephone in reach/Instruct patient to call for assistance before getting out of bed/chair/stretcher/Non-slip footwear applied when patient is off stretcher/[place] to call system/Physically safe environment - no spills, clutter or unnecessary equipment/Purposeful Proactive Rounding/Room/bathroom lighting operational, light cord in reach

## 2024-09-07 NOTE — ED ADULT NURSE NOTE - OBJECTIVE STATEMENT
Pt presents to ED c/o feeling fatigued needing to rest with minimal exertion starting yesterday morning with black granular diarrhea. Pt tolerated PO intake and increased water intake. Pt reports syncopal episode yesterday while answering the door. Pt denies hitting head. Pt denies N/V, CP. Pt reports SOB with exertion. Pt reports feeling lightheaded when he walks.

## 2024-09-07 NOTE — H&P ADULT - ASSESSMENT
79-year-old male with a past medical history of CAD, status post CABG x 4, stent placement, on Effient and aspirin. Presenting to the hospital for syncope episode and dark tarry stools that occurred yesterday patient states that he has had a couple episodes of black stool,    Assessment/Plan:    # Suspected Upper GI bleed 2/2 DAPT    -Protonix 80 mg  - Carafate 1 mg TID  -GI consulted, EGD on monday   - Hold DAPT until cleared by GI  -Type and cross    #CAD s/p CABG x4 and PCI    -Hold DAPT until cleared by GI  -Continue crestor     #    Code status: Full  Diet: Heart healthy  DVT ppx  Activity: Bedrest  Disposition: Admission for    79-year-old male with a past medical history of CAD, status post CABG x 4, stent placement, on Effient and aspirin. Presenting to the hospital for syncope episode and dark tarry stools that occurred yesterday patient states that he has had a couple episodes of black stool,    Assessment/Plan:    # Suspected Upper GI bleed 2/2 DAPT    -Protonix 80 mg  - Carafate 1 mg TID  -GI consulted, EGD on monday   - Hold DAPT until cleared by GI  -Type and cross    #Syncope likely 2/2 above  -Tele  -Orthostatics     #CAD s/p CABG x4 and PCI  -Hold DAPT until cleared by GI  -Continue crestor     #BPH          Code status: Full  Diet: Heart healthy  DVT ppx none  Activity: Bedrest  Disposition: Admission for syncope and GI bleed

## 2024-09-07 NOTE — ED ADULT TRIAGE NOTE - CHIEF COMPLAINT QUOTE
Pt presents to the ED c/o lightheadedness. Pt reports that yesterday he was feeling lightheadedness and had a syncope episode. Pt reports noticing dark colored stools since yesterday, denies noticing blood in stools, reports diarrhea. Pt is on Effient and ASA.

## 2024-09-07 NOTE — ED PROVIDER NOTE - CONSTITUTIONAL [+], MLM
I recommend these supplements:  Magnesium glycinate 400-1200 mg/day  Vitamin D 2000 IU daily  Alpha lipoic acid 300 mg 2x/day  Turmeric 500 mg 2x/day    Also consider:  B Complex vitamins  Selenium  Benfotiamine  SAMe  Omega 3    Gut friendly:  Probiotics (capsules)  Pre-biotics (vegetables, fiber)  Fermented foods (sauerkraut, kefir, kombucha, etc)  Bone broth    MINIMIZE:  Sugar  Processed grains (bread, pasta)  Dairy    AVOID:  Artificial sweeteners  Fake, manufactured foods      These are websites that other patients have found informative when researching CBD oil:  Remember, a \"full-spectrum\" product will have trace amounts of THC.    Luzaoil.com (local [employer])  thecbdistillery.Biomeme  cwhemp.Biomeme  easycbd.Biomeme  purehempbotanicals.com  hempworx.Biomeme  lazarusnaturals.Biomeme (has a patient assistance program)        Books:  Dr. Alexa Ortiz The Pain Antidote    Dr. Alexandre Colón Aches and Gains    Dr. Nori Pretty Holistic Pain Relief    Dr. Luis Daniel Casper Dog Fortify Your Life     Dr. Gerson Pal Eat Fat Get Lean    Dr. Joce King Back in Control: A Surgeon's Roadmap out of Chronic Pain    Dr. Jacquie Mitchell Paindemic    Dr. Noé Terry The Mindbody Prescription; Healing Back Pain: The Mind Body Connection;   The Divided Mind    Websites:    retrainpain.org/english    pain.goalistics.com  $120 for 4 months of pain coaching    painpathways.org (magazine subscription available)    painfreelivinglife.Biomeme  (magazine subscription available)    thegoodbody.Biomeme    yourbestbrain.com    unlearnyourpain.com    backincontrol.com    painoutloud.com     creakyjoints.org    tamethebeast.org    peacefulbodycoaching.com     instituteforchronicpain.org    themighty.com     gratefulness.org    curablehealth.com         weakness

## 2024-09-07 NOTE — ED PROVIDER NOTE - NEUROLOGICAL, MLM
Alert and oriented, no focal deficits, no motor or sensory deficits. Finger to nose normal b/l. -pronator drift. +facial symmetry.

## 2024-09-07 NOTE — ED PROVIDER NOTE - OBJECTIVE STATEMENT
79-year-old male with a past medical history of CAD, status post CABG x 4, stent placement, on Effient and aspirin, high cholesterol presents with wife for episodes of dizziness, weakness, syncope.  Patient states when he woke up yesterday morning he felt a little dizzy going to the bathroom and had to lay down a few times after the episode started.  Patient also noticed having some diarrhea that started yesterday and the diarrhea was characterized as black and grainy texture.  Patient had 2 more episodes which were also black in color throughout the day.  Patient states that he went to open that the door at approximately 3 PM and had a syncopal episode where he fell down with questionable LOC for a few seconds. Patient spoke with the on-call cardiologist yesterday Dr. Dickey who informed him to come to the emergency room versus trying to hydrate at home COVID if it would help with his symptoms. This morning patient noticed still having similar episodes which prompted his arrival to the ED.  -Kevon Alcantar PA-C   Cardio = mihaela LIM= lorna

## 2024-09-07 NOTE — PATIENT PROFILE ADULT - FALL HARM RISK - HARM RISK INTERVENTIONS
Assistance with ambulation/Assistance OOB with selected safe patient handling equipment/Communicate Risk of Fall with Harm to all staff/Monitor gait and stability/Reinforce activity limits and safety measures with patient and family/Sit up slowly, dangle for a short time, stand at bedside before walking/Tailored Fall Risk Interventions/Visual Cue: Yellow wristband and red socks/Bed in lowest position, wheels locked, appropriate side rails in place/Call bell, personal items and telephone in reach/Instruct patient to call for assistance before getting out of bed or chair/Non-slip footwear when patient is out of bed/[place] to call system/Physically safe environment - no spills, clutter or unnecessary equipment/Purposeful Proactive Rounding/Room/bathroom lighting operational, light cord in reach

## 2024-09-07 NOTE — ED PROVIDER NOTE - ATTENDING APP SHARED VISIT CONTRIBUTION OF CARE
I, Teagan Larson DO, personally saw the patient with ACP.  I have personally performed a face to face diagnostic evaluation on this patient.  I have reviewed the ACP note and agree with the history, exam, and plan of care, except as noted.  I personally saw the patient and performed a substantive portion of the visit including all aspects of the medical decision making.

## 2024-09-07 NOTE — ED ADULT NURSE REASSESSMENT NOTE - NS ED NURSE REASSESS COMMENT FT1
Unable to complete orthostatic VS due to patient getting dizzy when standing. Kevon TAN aware. Unable to complete orthostatic VS due to patient getting dizzy when standing. Kevon TAN aware. BP lying down 101/69, BP Sitting 93/59

## 2024-09-07 NOTE — H&P ADULT - HISTORY OF PRESENT ILLNESS
HPI:    79-year-old male with a past medical history of CAD, status post CABG x 4, stent placement, on Effient and aspirin. Presenting to the hospital for syncope episode and dark tarry stools that occurred yesterday patient states that he has had a couple episodes of black stool, Also having generalized weakness and lightheadedness when standing. Yesterday patient stood up and LOC for a few min. Patient did not immediately come to the hospital but continued to feel weak and light headed so came to  today. Denies NSAID use, tobacco , abdominal pain N/V.     PAST MEDICAL & SURGICAL HISTORY:  CAD (coronary artery disease)      S/P CABG x 4        FAMILY HISTORY:    Social History:      Allergies    Allergy Status Unknown    Intolerances        MEDICATIONS  (STANDING):  magnesium oxide 400 milliGRAM(s) Oral daily  multivitamin 1 Tablet(s) Oral daily  pantoprazole Infusion 8 mG/Hr (10 mL/Hr) IV Continuous <Continuous>  rosuvastatin 20 milliGRAM(s) Oral at bedtime  sodium chloride 0.9%. 1000 milliLiter(s) (100 mL/Hr) IV Continuous <Continuous>  sucralfate 1 Gram(s) Oral four times a day    MEDICATIONS  (PRN):  acetaminophen     Tablet .. 650 milliGRAM(s) Oral every 6 hours PRN Temp greater or equal to 38C (100.4F), Mild Pain (1 - 3)  aluminum hydroxide/magnesium hydroxide/simethicone Suspension 30 milliLiter(s) Oral every 4 hours PRN Dyspepsia  melatonin 3 milliGRAM(s) Oral at bedtime PRN Insomnia  ondansetron Injectable 4 milliGRAM(s) IV Push every 8 hours PRN Nausea and/or Vomiting      ROS:  General:  No fevers, chills, or unexplained weight loss  Skin: No rash or bothersome skin lesions  Musculoskeletal: No arthalgias, myalgias or joint swelling  Eyes: No visual changes or eye pain  Ears: No hearing loss , otorrhea or ear pain  Nose, Mouth, Throat: No nasal congestion, rhinorrhea, oral lesions, postnasal drip or sore throat  Cardio: No chest pain or palpitations. no lower extremity edema. no syncope. no claudication.   Respiratory: No cough, shortness of breath or wheezing   GI:, constipation,, abdominal pain, vomiting or heartburn  : No urinary frequency, hematuria, incontinence, or dysuria  Neurologic: No headaches, parasthesias, confusion, dysarthria or gait instability  Psychiatric:  No anxiety or depression  Lymphatic:  No easy bruising, easy bleeding or swollen glands  Allergic: No itching, sneezing , watery eyes, clear rhinorrhea or recurrent infections    PEx  T(C): 36.6 (24 @ 18:18), Max: 36.7 (24 @ 17:27)  HR: 73 (24 @ 18:18) (73 - 87)  BP: 103/65 (24 @ 18:18) (98/55 - 129/66)  RR: 18 (24 @ 18:18) (15 - 19)  SpO2: 100% (24 @ 18:18) (100% - 100%)  Wt(kg): --  General:     Well appearing, well nourished in no distress, no identifying marks , scars, or tattoos.  Skin: no rash or prominent lesions  Head: normocephalic, atraumatic     Nose: no external lesions, mucosa non-inflamed, septum and turbinates normal  Throat: no erythema, exudates or lesions.  Neck: Supple without lymphadenopathy. Thyroid no thyromegaly, no palpable thyroid nodules, no palpable nodules or masses, carotid arteries no bruits.   Heart: RRR, no murmur or gallop.  Normal S1, S2.  No S3, S4.   Lungs: CTA bilaterally, no wheezes, rhonchi, rales.  Breathing unlabored.   Abdomen:  Soft, NT/ND, normal bowel sounds, no HSM, no masses.  No peritoneal signs.   Back: spine normal without deformity or tenderness.  Normal ROM   : Exam normal.  no inguinal hernias.  Extremities: No deformities, clubbing, cyanosis, or edema.  Musculoskeletal: Normal gait and station. No decreased range of motion, instability, atrophy or abnormal strength or tone in the head, neck, spine, ribs, pelvis or extremities.   Neurologic: CN 2-12 normal. Sensation to pain, touch and proprioception normal. DTRs normal in upper and lower extremities. No pathologic reflexes.  Motor normal.  Psychiatric: Oriented X3, intact recent and remote memory, judgement and insight, normal mood and affect.                          9.4    6.02  )-----------( 166      ( 07 Sep 2024 18:35 )             27.7     09-07    137  |  109<H>  |  40<H>  ----------------------------<  113<H>  4.0   |  25  |  0.78    Ca    8.5      07 Sep 2024 14:37  Mg     2.2         TPro  5.8<L>  /  Alb  3.2<L>  /  TBili  0.3  /  DBili  x   /  AST  20  /  ALT  24  /  AlkPhos  30<L>      CAPILLARY BLOOD GLUCOSE      POCT Blood Glucose.: 142 mg/dL (07 Sep 2024 12:38)    PT/INR - ( 07 Sep 2024 14:37 )   PT: 11.5 sec;   INR: 1.02 ratio         PTT - ( 07 Sep 2024 14:37 )  PTT:23.4 sec  Urinalysis Basic - ( 07 Sep 2024 14:37 )    Color: Yellow / Appearance: Clear / S.005 / pH: x  Gluc: 113 mg/dL / Ketone: Negative mg/dL  / Bili: Negative / Urobili: 0.2 mg/dL   Blood: x / Protein: Negative mg/dL / Nitrite: Negative   Leuk Esterase: Negative / RBC: x / WBC x   Sq Epi: x / Non Sq Epi: x / Bacteria: x          Urinalysis with Rflx Culture (collected 24 @ 14:37)        Radiology/Imaging, I have personally reviewed:      CT abdomen negative

## 2024-09-07 NOTE — H&P ADULT - TIME BILLING
The necessity of time spent during the encounter on this date of service was due to:   - Personally obtaining the documented history, review of systems and physical exam where appropriate   - Ordering, reviewing, and interpreting labs, imaging, and other tests where appropriate   - Reviewing consultant documentation/recommendations in addition to discussing plan of care with consultants where appropriate   - Answering questions, providing  and informing patient and/or family regarding above items and plan of care   - Documentation as above.

## 2024-09-07 NOTE — ED PROVIDER NOTE - CLINICAL SUMMARY MEDICAL DECISION MAKING FREE TEXT BOX
Teagan Larson,  Attending Physician:   79-yo male with a pmh of CAD, status post CABG x 4, stent placement, on Effient and aspirin, high cholesterol BIBEMS for episodes of dizziness, generalized weakness and syncope.  Patient states when he woke up yesterday morning he felt a little dizzy going to the bathroom and had to lay down a few times after the episode started. reported 2-3 episodes of diarrhea that started yesterday, states stools were black and grainy texture. had a syncopal episode while opening his door at 3 pm where he fell down with questionable LOC for a few seconds. Patient spoke with the on-call cardiologist yesterday Dr. Dickey who informed him to come to the emergency room. today he had similar episodes so he came to ER to get evaluated.    PE  General: Patient in no acute distress, AAOX3.   HENMT: NC/AT, no nasal congestion, MMM  Neck: supple  CVS: regular rate and rhythm, no murmur  Resp: Good air entry bilaterally, No wheeze/rhonchi.  Abd: Soft non tender, non distended, +bowel sounds, No guarding, rebound tenderness   Ext: FROM in all ext, 2+ pulses throughout, cap refill<2 sec.  BACK: no midline tenderness, no stepoffs  NEURO: no focal deficit, gross motor and sensory intact throughout    Plan: 79-yo male with a pmh of CAD, status post CABG x 4, stent placement, on Effient and aspirin, high cholesterol BIBEMS for episodes of dizziness, generalized weakness, syncope. and black stools. will r/o GI bleed vs Intracranial pathology vs arrhythmias vs anemia vs electrolyte abnormalities. Plan to do labs, ekg, imaging, guaiac test and reassess.     + guiac,Hb 9.9. GI oncall consulted, endoscopy on monday, s/p ppi. will admit

## 2024-09-07 NOTE — ED PROVIDER NOTE - PROGRESS NOTE DETAILS
Guaiac performed. +black stool, no gross blood, Guaiac +, lot [id number], QC check. Protonix bolus and drip ordered. Will consult Gi pending labs and likely admission. Pt aware. -Kevon Alcantar PA-C Discussed pt with GI Dr Hester. P to be admitted , likely endoscopy. Pending CT prior to admission. -Kevon Alcantar PA-C Patient reevaluated.  Informed patient and wife of results of the CTs, labs were seen globin was 9.9.  Also discussed case with Dr. Hester and from GI who was will was aware and came in to see patient.  Endoscopy will likely take place on Monday.  Will admit to hospitalist Dr. aceves. -Kevon Alcantar PA-C

## 2024-09-08 LAB
ANION GAP SERPL CALC-SCNC: 5 MMOL/L — SIGNIFICANT CHANGE UP (ref 5–17)
BUN SERPL-MCNC: 23 MG/DL — SIGNIFICANT CHANGE UP (ref 7–23)
CALCIUM SERPL-MCNC: 8.2 MG/DL — LOW (ref 8.5–10.1)
CHLORIDE SERPL-SCNC: 114 MMOL/L — HIGH (ref 96–108)
CO2 SERPL-SCNC: 24 MMOL/L — SIGNIFICANT CHANGE UP (ref 22–31)
CREAT SERPL-MCNC: 0.88 MG/DL — SIGNIFICANT CHANGE UP (ref 0.5–1.3)
EGFR: 89 ML/MIN/1.73M2 — SIGNIFICANT CHANGE UP
GLUCOSE SERPL-MCNC: 100 MG/DL — HIGH (ref 70–99)
HCT VFR BLD CALC: 24.6 % — LOW (ref 39–50)
HCT VFR BLD CALC: 25.1 % — LOW (ref 39–50)
HGB BLD-MCNC: 8.2 G/DL — LOW (ref 13–17)
HGB BLD-MCNC: 8.3 G/DL — LOW (ref 13–17)
MCHC RBC-ENTMCNC: 32 PG — SIGNIFICANT CHANGE UP (ref 27–34)
MCHC RBC-ENTMCNC: 33.3 GM/DL — SIGNIFICANT CHANGE UP (ref 32–36)
MCV RBC AUTO: 96.1 FL — SIGNIFICANT CHANGE UP (ref 80–100)
PLATELET # BLD AUTO: 156 K/UL — SIGNIFICANT CHANGE UP (ref 150–400)
POTASSIUM SERPL-MCNC: 4.1 MMOL/L — SIGNIFICANT CHANGE UP (ref 3.5–5.3)
POTASSIUM SERPL-SCNC: 4.1 MMOL/L — SIGNIFICANT CHANGE UP (ref 3.5–5.3)
RBC # BLD: 2.56 M/UL — LOW (ref 4.2–5.8)
RBC # FLD: 13.9 % — SIGNIFICANT CHANGE UP (ref 10.3–14.5)
SODIUM SERPL-SCNC: 143 MMOL/L — SIGNIFICANT CHANGE UP (ref 135–145)
WBC # BLD: 5.89 K/UL — SIGNIFICANT CHANGE UP (ref 3.8–10.5)
WBC # FLD AUTO: 5.89 K/UL — SIGNIFICANT CHANGE UP (ref 3.8–10.5)

## 2024-09-08 PROCEDURE — 99232 SBSQ HOSP IP/OBS MODERATE 35: CPT

## 2024-09-08 RX ORDER — TRAZODONE HCL 50 MG
25 TABLET ORAL ONCE
Refills: 0 | Status: COMPLETED | OUTPATIENT
Start: 2024-09-08 | End: 2024-09-08

## 2024-09-08 RX ADMIN — SUCRALFATE 1 GRAM(S): 1 SUSPENSION ORAL at 17:11

## 2024-09-08 RX ADMIN — MAGNESIUM OXIDE TAB 400 MG (240 MG ELEMENTAL MG) 400 MILLIGRAM(S): 400 (240 MG) TAB at 09:59

## 2024-09-08 RX ADMIN — Medication 1 TABLET(S): at 09:59

## 2024-09-08 RX ADMIN — SUCRALFATE 1 GRAM(S): 1 SUSPENSION ORAL at 12:02

## 2024-09-08 RX ADMIN — SUCRALFATE 1 GRAM(S): 1 SUSPENSION ORAL at 21:14

## 2024-09-08 RX ADMIN — Medication 10 MG/HR: at 09:57

## 2024-09-08 RX ADMIN — SUCRALFATE 1 GRAM(S): 1 SUSPENSION ORAL at 06:22

## 2024-09-08 RX ADMIN — ROSUVASTATIN CALCIUM 20 MILLIGRAM(S): 10 TABLET ORAL at 21:12

## 2024-09-08 RX ADMIN — ACETAMINOPHEN 650 MILLIGRAM(S): 325 TABLET ORAL at 21:12

## 2024-09-08 RX ADMIN — Medication 25 MILLIGRAM(S): at 21:12

## 2024-09-08 NOTE — PROGRESS NOTE ADULT - ASSESSMENT
9/8 no BM  during his hospital stay, repeat Hb 8.2 stable today ,if Hb <8 will tranfuse due to hx CAD stents, Dr Dickey consulted for management of DAPT    79-year-old male with a past medical history of CAD, status post CABG x 4, stent placement, on Effient and aspirin. Presenting to the hospital for syncope episode and dark tarry stools that occurred yesterday patient states that he has had a couple episodes of black stool,    Assessment/Plan:    # Suspected Upper GI bleed 2/2 DAPT    -Protonix 80 mg  - Carafate 1 mg TID  -GI consulted, EGD on monday   - Hold DAPT until cleared by GI  -Type and cross    #Syncope likely 2/2 above  -Tele  -Orthostatics     #CAD s/p CABG x4 and PCI  -Hold DAPT until cleared by GI  -Continue crestor     #BPHCode status: Full  Diet: Heart healthy  DVT ppx none    Disposition: plan for EGD tmrw  total time spent 60mins dw patient and wife at bedside

## 2024-09-08 NOTE — PROGRESS NOTE ADULT - ASSESSMENT
1. Anemia with melena concerning for UGIB    Recommendation  1. NPO after midnight for EGD with Dr. Hester  2. Continue PPI gtt  3. Monitor for overt bleeding and transfuse for hgb < 7

## 2024-09-09 VITALS
SYSTOLIC BLOOD PRESSURE: 106 MMHG | DIASTOLIC BLOOD PRESSURE: 54 MMHG | TEMPERATURE: 98 F | RESPIRATION RATE: 18 BRPM | OXYGEN SATURATION: 97 % | HEART RATE: 66 BPM

## 2024-09-09 DIAGNOSIS — I25.10 ATHEROSCLEROTIC HEART DISEASE OF NATIVE CORONARY ARTERY WITHOUT ANGINA PECTORIS: ICD-10-CM

## 2024-09-09 DIAGNOSIS — I34.0 NONRHEUMATIC MITRAL (VALVE) INSUFFICIENCY: ICD-10-CM

## 2024-09-09 DIAGNOSIS — K92.2 GASTROINTESTINAL HEMORRHAGE, UNSPECIFIED: ICD-10-CM

## 2024-09-09 DIAGNOSIS — I36.1 NONRHEUMATIC TRICUSPID (VALVE) INSUFFICIENCY: ICD-10-CM

## 2024-09-09 DIAGNOSIS — Z95.1 PRESENCE OF AORTOCORONARY BYPASS GRAFT: ICD-10-CM

## 2024-09-09 LAB
ANION GAP SERPL CALC-SCNC: 3 MMOL/L — LOW (ref 5–17)
BASOPHILS # BLD AUTO: 0.05 K/UL — SIGNIFICANT CHANGE UP (ref 0–0.2)
BASOPHILS NFR BLD AUTO: 0.8 % — SIGNIFICANT CHANGE UP (ref 0–2)
BUN SERPL-MCNC: 18 MG/DL — SIGNIFICANT CHANGE UP (ref 7–23)
CALCIUM SERPL-MCNC: 8.5 MG/DL — SIGNIFICANT CHANGE UP (ref 8.5–10.1)
CHLORIDE SERPL-SCNC: 109 MMOL/L — HIGH (ref 96–108)
CO2 SERPL-SCNC: 27 MMOL/L — SIGNIFICANT CHANGE UP (ref 22–31)
CREAT SERPL-MCNC: 0.93 MG/DL — SIGNIFICANT CHANGE UP (ref 0.5–1.3)
EGFR: 85 ML/MIN/1.73M2 — SIGNIFICANT CHANGE UP
EOSINOPHIL # BLD AUTO: 0.39 K/UL — SIGNIFICANT CHANGE UP (ref 0–0.5)
EOSINOPHIL NFR BLD AUTO: 6.1 % — HIGH (ref 0–6)
GLUCOSE SERPL-MCNC: 96 MG/DL — SIGNIFICANT CHANGE UP (ref 70–99)
HCT VFR BLD CALC: 26.8 % — LOW (ref 39–50)
HGB BLD-MCNC: 9 G/DL — LOW (ref 13–17)
IMM GRANULOCYTES NFR BLD AUTO: 0.5 % — SIGNIFICANT CHANGE UP (ref 0–0.9)
LYMPHOCYTES # BLD AUTO: 2.41 K/UL — SIGNIFICANT CHANGE UP (ref 1–3.3)
LYMPHOCYTES # BLD AUTO: 37.5 % — SIGNIFICANT CHANGE UP (ref 13–44)
MCHC RBC-ENTMCNC: 32.4 PG — SIGNIFICANT CHANGE UP (ref 27–34)
MCHC RBC-ENTMCNC: 33.6 GM/DL — SIGNIFICANT CHANGE UP (ref 32–36)
MCV RBC AUTO: 96.4 FL — SIGNIFICANT CHANGE UP (ref 80–100)
MONOCYTES # BLD AUTO: 0.42 K/UL — SIGNIFICANT CHANGE UP (ref 0–0.9)
MONOCYTES NFR BLD AUTO: 6.5 % — SIGNIFICANT CHANGE UP (ref 2–14)
NEUTROPHILS # BLD AUTO: 3.13 K/UL — SIGNIFICANT CHANGE UP (ref 1.8–7.4)
NEUTROPHILS NFR BLD AUTO: 48.6 % — SIGNIFICANT CHANGE UP (ref 43–77)
PLATELET # BLD AUTO: 165 K/UL — SIGNIFICANT CHANGE UP (ref 150–400)
POTASSIUM SERPL-MCNC: 3.4 MMOL/L — LOW (ref 3.5–5.3)
POTASSIUM SERPL-SCNC: 3.4 MMOL/L — LOW (ref 3.5–5.3)
RBC # BLD: 2.78 M/UL — LOW (ref 4.2–5.8)
RBC # FLD: 14.1 % — SIGNIFICANT CHANGE UP (ref 10.3–14.5)
SODIUM SERPL-SCNC: 139 MMOL/L — SIGNIFICANT CHANGE UP (ref 135–145)
WBC # BLD: 6.43 K/UL — SIGNIFICANT CHANGE UP (ref 3.8–10.5)
WBC # FLD AUTO: 6.43 K/UL — SIGNIFICANT CHANGE UP (ref 3.8–10.5)

## 2024-09-09 PROCEDURE — 88305 TISSUE EXAM BY PATHOLOGIST: CPT | Mod: 26

## 2024-09-09 PROCEDURE — 99239 HOSP IP/OBS DSCHRG MGMT >30: CPT

## 2024-09-09 PROCEDURE — 88312 SPECIAL STAINS GROUP 1: CPT | Mod: 26

## 2024-09-09 RX ORDER — POTASSIUM CHLORIDE 10 MEQ
10 TABLET, EXT RELEASE, PARTICLES/CRYSTALS ORAL ONCE
Refills: 0 | Status: COMPLETED | OUTPATIENT
Start: 2024-09-09 | End: 2024-09-09

## 2024-09-09 RX ORDER — PRASUGREL 5 MG/1
1 TABLET, FILM COATED ORAL
Refills: 0 | DISCHARGE

## 2024-09-09 RX ORDER — PANTOPRAZOLE SODIUM 40 MG
40 TABLET, DELAYED RELEASE (ENTERIC COATED) ORAL
Refills: 0 | Status: DISCONTINUED | OUTPATIENT
Start: 2024-09-09 | End: 2024-09-09

## 2024-09-09 RX ORDER — PANTOPRAZOLE SODIUM 40 MG
1 TABLET, DELAYED RELEASE (ENTERIC COATED) ORAL
Qty: 60 | Refills: 0
Start: 2024-09-09 | End: 2024-10-08

## 2024-09-09 RX ADMIN — Medication 10 MG/HR: at 15:18

## 2024-09-09 RX ADMIN — Medication 100 MILLIEQUIVALENT(S): at 13:11

## 2024-09-09 NOTE — CONSULT NOTE ADULT - ASSESSMENT
9/9/24:  Pt with above history and ASHD s/p CABG on 3/31/23 but graft closure by 8/2023 and had stent PCI to the native OM1 and RCA by Dr Larson at [facility].  He had a normal PET nuclear stress test on 8/12/24 and an unremarkable echo as well (as above).  He is stable from a cardiac standpoint for needed upper endoscopy as planned and if a colonoscopy is needed, he stable for that as well or other procedures that are required  Will need to stay off the Effient and ASA for now.  Will consider only low dose ASA in the future.  Continue as outlined buy medicine and GI etal.  Will follow as work-up and treatment progresses.  
Imp:  79 M with CAD on ASA and effient with melena    Rec:  Cont protonix drip  Tentative plan for EGD monday  OK to eat since EGD not planned right now

## 2024-09-09 NOTE — DISCHARGE NOTE PROVIDER - CARE PROVIDER_API CALL
Russell Hester  Gastroenterology  [address], [address]  [place] [address]  Phone: [phone]  Fax: [phone]  Follow Up Time:     Eriberto Retana  Cardiovascular Disease  [address], [address]  [place] [address]  Phone: [phone]  Fax: [phone]  Follow Up Time:

## 2024-09-09 NOTE — DISCHARGE NOTE PROVIDER - HOSPITAL COURSE
79-year-old male with a past medical history of CAD, status post CABG x 4, stent placement, on Effient and aspirin. Presenting to the hospital for syncope episode and dark tarry stools that occurred yesterday patient states that he has had a couple episodes of black stool, Also having generalized weakness and lightheadedness when standing. Yesterday patient stood up and LOC for a few min. Patient did not immediately come to the hospital but continued to feel weak and light headed so came to  today. Denies NSAID use, tobacco , abdominal pain N/V.    9/9 no BM  during his hospital stay, repeat Hb 9 stable  , s/p EGD cleared by GI for discharge, pt denies CP, abd pain, no sob     79-year-old male with a past medical history of CAD, status post CABG x 4, stent placement, on Effient and aspirin. Presenting to the hospital for syncope episode and dark tarry stools that occurred yesterday patient states that he has had a couple episodes of black stool,    Assessment/Plan:    #  Upper GI bleed while on  DAPT secondary to duodenal AVM   EGD:  5 mm clean based antral ulcer  Duodenal AVM - cauterized  cleared by GI and cardio for dc   Protonix 40 bid  I'm OK with resuming an antiplatelet agent  F/U with  GI and cardio in office   Will consider only low dose ASA , stopped effient per cardio    #Syncope likely secondary to dehydration  no further episodes  -Tele no arrhythmia   cardio eval appreciated     #CAD s/p CABG x4 and PCI  dc effient, cw only asa 81per cardio    #BPH  resume home med        PE  General:     Well appearing, well nourished in no distress, no identifying marks , scars, or tattoos.  Skin: no rash or prominent lesions  Head: normocephalic, atraumatic     Nose: no external lesions, mucosa non-inflamed, septum and turbinates normal  Throat: no erythema, exudates or lesions.  Neck: Supple without lymphadenopathy. Thyroid no thyromegaly, no palpable thyroid nodules, no palpable nodules or masses, carotid arteries no bruits.   Heart: RRR,   Lungs: CTA bilaterally, no wheezes, rhonchi, rales.  Breathing unlabored.   Abdomen:  Soft, NT/ND, normal bowel sounds, no HSM, no masses.  No peritoneal signs.   Back: spine normal without deformity or tenderness.  Normal ROM     Musculoskeletal: Normal gait and station. No decreased range of motion, instability, atrophy or abnormal strength or tone in the head, neck, spine, ribs, pelvis or extremities.   Neurologic: CN 2-12 normal. Sensation to pain, touch and proprioception normal. DTRs normal in upper and lower extremities. No pathologic reflexes.  Motor normal.  Psychiatric: Oriented X3, intact recent and remote memory, judgement and insight, normal mood and affect    discharge time 47mins

## 2024-09-09 NOTE — DISCHARGE NOTE PROVIDER - NSDCMRMEDTOKEN_GEN_ALL_CORE_FT
alfuzosin 10 mg oral tablet, extended release: 1 tab(s) orally once a day  Aspir 81 oral delayed release tablet: 1 tab(s) orally once a day  Co Q-10 100 mg oral capsule: 1 cap(s) orally once a day  Crestor 20 mg oral tablet: 1 tab(s) orally once a day  lidocaine 5% topical film: Apply topically to affected area once a day  magnesium oxide 400 mg oral tablet: 1 tab(s) orally once a day  Multiple Vitamins oral tablet: 1 tab(s) orally once a day  Omega-3 1000 mg oral capsule: 1 cap(s) orally once a day  pantoprazole 40 mg oral delayed release tablet: 1 tab(s) orally 2 times a day  Probiotic Formula oral capsule: 1 cap(s) orally once a day

## 2024-09-09 NOTE — DISCHARGE NOTE NURSING/CASE MANAGEMENT/SOCIAL WORK - PATIENT PORTAL LINK FT
You can access the FollowMyHealth Patient Portal offered by [facility] by registering at the following website: [email or web address]. By joining [facility]’s FollowMyHealth portal, you will also be able to view your health information using other applications (apps) compatible with our system.

## 2024-09-09 NOTE — PROGRESS NOTE ADULT - SUBJECTIVE AND OBJECTIVE BOX
79-year-old male with a past medical history of CAD, status post CABG x 4, stent placement, on Effient and aspirin. Presenting to the hospital for syncope episode and dark tarry stools that occurred yesterday patient states that he has had a couple episodes of black stool, Also having generalized weakness and lightheadedness when standing. Yesterday patient stood up and LOC for a few min. Patient did not immediately come to the hospital but continued to feel weak and light headed so came to  today. Denies NSAID use, tobacco , abdominal pain N/V.   no BM  during his hospital stay, repeat Hb 8.2 stable today ,if Hb <8 will tranfuse due to hx CAD stents, Dr Dickey consulted for management of DAPT     ROS:  General:  No fevers, chills, or unexplained weight loss  Skin: No rash or bothersome skin lesions  Musculoskeletal: No arthalgias, myalgias or joint swelling  Eyes: No visual changes or eye pain  Ears: No hearing loss , otorrhea or ear pain  Nose, Mouth, Throat: No nasal congestion, rhinorrhea, oral lesions, postnasal drip or sore throat  Cardio: No chest pain or palpitations. no lower extremity edema. no syncope. no claudication.   Respiratory: No cough, shortness of breath or wheezing   GI:, constipation,, abdominal pain, vomiting or heartburn  : No urinary frequency, hematuria, incontinence, or dysuria  Neurologic: No headaches, parasthesias, confusion, dysarthria or gait instability  Psychiatric:  No anxiety or depression  Lymphatic:  No easy bruising, easy bleeding or swollen glands  Allergic: No itching, sneezing , watery eyes, clear rhinorrhea or recurrent infections    PE  General:     Well appearing, well nourished in no distress, no identifying marks , scars, or tattoos.  Skin: no rash or prominent lesions  Head: normocephalic, atraumatic     Nose: no external lesions, mucosa non-inflamed, septum and turbinates normal  Throat: no erythema, exudates or lesions.  Neck: Supple without lymphadenopathy. Thyroid no thyromegaly, no palpable thyroid nodules, no palpable nodules or masses, carotid arteries no bruits.   Heart: RRR, no murmur or gallop.  Normal S1, S2.  No S3, S4.   Lungs: CTA bilaterally, no wheezes, rhonchi, rales.  Breathing unlabored.   Abdomen:  Soft, NT/ND, normal bowel sounds, no HSM, no masses.  No peritoneal signs.   Back: spine normal without deformity or tenderness.  Normal ROM   : Exam normal.  no inguinal hernias.  Extremities: No deformities, clubbing, cyanosis, or edema.  Musculoskeletal: Normal gait and station. No decreased range of motion, instability, atrophy or abnormal strength or tone in the head, neck, spine, ribs, pelvis or extremities.   Neurologic: CN 2-12 normal. Sensation to pain, touch and proprioception normal. DTRs normal in upper and lower extremities. No pathologic reflexes.  Motor normal.  Psychiatric: Oriented X3, intact recent and remote memory, judgement and insight, normal mood and affect      PHYSICAL EXAM:    Daily     Daily Weight in k.3 (07 Sep 2024 18:18)    ICU Vital Signs Last 24 Hrs  T(C): 36.9 (08 Sep 2024 08:58), Max: 36.9 (08 Sep 2024 08:58)  T(F): 98.4 (08 Sep 2024 08:58), Max: 98.4 (08 Sep 2024 08:58)  HR: 73 (08 Sep 2024 08:58) (73 - 90)  BP: 105/57 (08 Sep 2024 08:58) (103/65 - 129/66)  BP(mean): --  ABP: --  ABP(mean): --  RR: 18 (08 Sep 2024 08:58) (15 - 19)  SpO2: 100% (08 Sep 2024 08:58) (100% - 100%)    O2 Parameters below as of 07 Sep 2024 20:46  Patient On (Oxygen Delivery Method): room air                                    8.3    x     )-----------( x        ( 08 Sep 2024 12:48 )             25.1       CBC Full  -  ( 08 Sep 2024 12:48 )  WBC Count : x  RBC Count : x  Hemoglobin : 8.3 g/dL  Hematocrit : 25.1 %  Platelet Count - Automated : x  Mean Cell Volume : x  Mean Cell Hemoglobin : x  Mean Cell Hemoglobin Concentration : x  Auto Neutrophil # : x  Auto Lymphocyte # : x  Auto Monocyte # : x  Auto Eosinophil # : x  Auto Basophil # : x  Auto Neutrophil % : x  Auto Lymphocyte % : x  Auto Monocyte % : x  Auto Eosinophil % : x  Auto Basophil % : x          143  |  114<H>  |  23  ----------------------------<  100<H>  4.1   |  24  |  0.88    Ca    8.2<L>      08 Sep 2024 08:13  Mg     2.2         TPro  5.8<L>  /  Alb  3.2<L>  /  TBili  0.3  /  DBili  x   /  AST  20  /  ALT  24  /  AlkPhos  30<L>        LIVER FUNCTIONS - ( 07 Sep 2024 14:37 )  Alb: 3.2 g/dL / Pro: 5.8 gm/dL / ALK PHOS: 30 U/L / ALT: 24 U/L / AST: 20 U/L / GGT: x             PT/INR - ( 07 Sep 2024 14:37 )   PT: 11.5 sec;   INR: 1.02 ratio         PTT - ( 07 Sep 2024 14:37 )  PTT:23.4 sec          Urinalysis Basic - ( 08 Sep 2024 08:13 )    Color: x / Appearance: x / SG: x / pH: x  Gluc: 100 mg/dL / Ketone: x  / Bili: x / Urobili: x   Blood: x / Protein: x / Nitrite: x   Leuk Esterase: x / RBC: x / WBC x   Sq Epi: x / Non Sq Epi: x / Bacteria: x            MEDICATIONS  (STANDING):  magnesium oxide 400 milliGRAM(s) Oral daily  multivitamin 1 Tablet(s) Oral daily  pantoprazole Infusion 8 mG/Hr (10 mL/Hr) IV Continuous <Continuous>  rosuvastatin 20 milliGRAM(s) Oral at bedtime  sucralfate 1 Gram(s) Oral four times a day      
EGD:  5 mm clean based antral ulcer  Duodenal AVM - cauterized    Rec:  Advance diet  OK for d/c by me  Protonix 40 bid  I'm OK with resuming an antiplatelet agent  F/U with me in office
Patient is a 79y old  Male who presents with a chief complaint of     Subective: Seen and examined at bedside. No overnight events. No further episodes of melena (last on 9/7). Denies abdominal pain, nausea, vomiting. Tolerating diet.       PAST MEDICAL & SURGICAL HISTORY:  CAD (coronary artery disease)      S/P CABG x 4          MEDICATIONS  (STANDING):  magnesium oxide 400 milliGRAM(s) Oral daily  multivitamin 1 Tablet(s) Oral daily  pantoprazole Infusion 8 mG/Hr (10 mL/Hr) IV Continuous <Continuous>  rosuvastatin 20 milliGRAM(s) Oral at bedtime  sucralfate 1 Gram(s) Oral four times a day    MEDICATIONS  (PRN):  acetaminophen     Tablet .. 650 milliGRAM(s) Oral every 6 hours PRN Temp greater or equal to 38C (100.4F), Mild Pain (1 - 3)  aluminum hydroxide/magnesium hydroxide/simethicone Suspension 30 milliLiter(s) Oral every 4 hours PRN Dyspepsia  melatonin 3 milliGRAM(s) Oral at bedtime PRN Insomnia  ondansetron Injectable 4 milliGRAM(s) IV Push every 8 hours PRN Nausea and/or Vomiting      REVIEW OF SYSTEMS:    RESPIRATORY: No shortness of breath  CARDIOVASCULAR: No chest pain  All other review of systems is negative unless indicated above.    Vital Signs Last 24 Hrs  T(C): 36.9 (08 Sep 2024 08:58), Max: 36.9 (08 Sep 2024 08:58)  T(F): 98.4 (08 Sep 2024 08:58), Max: 98.4 (08 Sep 2024 08:58)  HR: 73 (08 Sep 2024 08:58) (73 - 90)  BP: 105/57 (08 Sep 2024 08:58) (98/55 - 129/66)  BP(mean): 68 (07 Sep 2024 12:35) (68 - 68)  RR: 18 (08 Sep 2024 08:58) (15 - 19)  SpO2: 100% (08 Sep 2024 08:58) (100% - 100%)    Parameters below as of 07 Sep 2024 20:46  Patient On (Oxygen Delivery Method): room air        PHYSICAL EXAM:    Constitutional: NAD, well-developed  Respiratory: CTAB  Cardiovascular: S1 and S2, RRR  Gastrointestinal: BS+, soft, NT/ND  Extremities: No peripheral edema  Psychiatric: Normal mood, normal affect    LABS:                        8.2    5.89  )-----------( 156      ( 08 Sep 2024 08:13 )             24.6     09-08    143  |  114<H>  |  23  ----------------------------<  100<H>  4.1   |  24  |  0.88    Ca    8.2<L>      08 Sep 2024 08:13  Mg     2.2     09-07    TPro  5.8<L>  /  Alb  3.2<L>  /  TBili  0.3  /  DBili  x   /  AST  20  /  ALT  24  /  AlkPhos  30<L>  09-07    PT/INR - ( 07 Sep 2024 14:37 )   PT: 11.5 sec;   INR: 1.02 ratio         PTT - ( 07 Sep 2024 14:37 )  PTT:23.4 sec  LIVER FUNCTIONS - ( 07 Sep 2024 14:37 )  Alb: 3.2 g/dL / Pro: 5.8 gm/dL / ALK PHOS: 30 U/L / ALT: 24 U/L / AST: 20 U/L / GGT: x             RADIOLOGY & ADDITIONAL STUDIES:

## 2024-09-09 NOTE — CONSULT NOTE ADULT - SUBJECTIVE AND OBJECTIVE BOX
CHIEF COMPLAINT:  Patient is a 79y old  Male who presents with a chief complaint of syncope, dizziness, SOB, black stools    HPI: 24:  79-year-old male, well know to me, with a past medical history of CAD, status post CABG x 4 by Dr Zuniga on 3/31/23 using LIMA=>LAD and RSVG=>D1 and sequential graft to RPDA and OM1 but had cardiac cath on 23 by Dr Larson at [facility] that showed a patent LIMA=>LAD but occluded seq graft and diffuse disease in the D1 and had stent placement to OM1 and RCA and placed on Effient and aspirin since.  He had a PET nuclear stress test on 24 that was normal showing no obvious ischemia and an Echo in my office on 24 showed normal LV size and systolic function with LVEF=60-65% with a mildly dilated aortic root (3.9-4.0 cm) with mild MR and TR but otherwise a normal study. Presenting to the hospital for syncope episode and dark tarry stools that occurred yesterday patient states that he has had a couple episodes of black stool, Also having generalized weakness and lightheadedness when standing. Yesterday patient stood up and LOC for a few min. Patient did not immediately come to the hospital but continued to feel weak and light headed so came to  today. Denies NSAID use, tobacco , abdominal pain N/V.     PAST MEDICAL & SURGICAL HISTORY:  CAD (coronary artery disease)  S/P CABG x 4 as above  Stent PCI as above      FAMILY HISTORY:  non-contributory      Social History:    non-smoker      Allergies  Allergy Status Unknown      REVIEW OF SYSTEMS:  10 point ROS was obtained  Pertinent positives and negatives are as above  All other review of systems is negative unless indicated above      Vital Signs Last 24 Hrs  T(C): 36.9 (08 Sep 2024 23:42), Max: 37 (08 Sep 2024 15:00)  T(F): 98.5 (08 Sep 2024 23:42), Max: 98.6 (08 Sep 2024 15:00)  HR: 80 (08 Sep 2024 23:42) (73 - 80)  BP: 124/63 (08 Sep 2024 23:42) (100/57 - 124/63)  RR: 18 (08 Sep 2024 23:42) (18 - 18)  SpO2: 100% (08 Sep 2024 23:42) (97% - 100%): room air      I&O's Summary  CAPILLARY BLOOD GLUCOSE      PHYSICAL EXAM:   Constitutional: NAD, awake and alert, well-developed  HEENT: PERR, EOMI, Normal Hearing, MMM  Neck: Soft and supple, No LAD, No JVD  Respiratory: Breath sounds are clear bilaterally, No wheezing, rales or rhonchi  Cardiovascular: S1 and S2, regular rate and rhythm, soft MASOOD at LLSB and base as before, no gallops or rubs  Gastrointestinal: Bowel Sounds present, soft, nontender, nondistended, no guarding, no rebound  Extremities: No peripheral edema  Vascular: 2+ peripheral pulses  Neurological: A/O x 3, no focal deficits  Musculoskeletal: 5/5 strength b/l upper and lower extremities  Skin: No rashes      MEDICATIONS  (STANDING):  magnesium oxide 400 milliGRAM(s) Oral daily  multivitamin 1 Tablet(s) Oral daily  pantoprazole Infusion 8 mG/Hr (10 mL/Hr) IV Continuous <Continuous>  rosuvastatin 20 milliGRAM(s) Oral at bedtime  sucralfate 1 Gram(s) Oral four times a day    MEDICATIONS  (PRN):  acetaminophen     Tablet .. 650 milliGRAM(s) Oral every 6 hours PRN Temp greater or equal to 38C (100.4F), Mild Pain (1 - 3)  aluminum hydroxide/magnesium hydroxide/simethicone Suspension 30 milliLiter(s) Oral every 4 hours PRN Dyspepsia  melatonin 3 milliGRAM(s) Oral at bedtime PRN Insomnia  ondansetron Injectable 4 milliGRAM(s) IV Push every 8 hours PRN Nausea and/or Vomiting      LABS: All Labs Reviewed:                        8.3    x     )-----------( x        ( 08 Sep 2024 12:48 )             25.1     Hemoglobin: 8.3 g/dL (24 @ 12:48)   Hemoglobin: 8.2 g/dL (24 @ 08:13)   Hemoglobin: 9.4 g/dL (24 @ 18:35)   Hemoglobin: 9.9 g/dL (24 @ 14:37)           143  |  114<H>  |  23  ----------------------------<  100<H>  4.1   |  24  |  0.88    Ca    8.2<L>      08 Sep 2024 08:13  Mg     2.2         TPro  5.8<L>  /  Alb  3.2<L>  /  TBili  0.3  /  DBili  x   /  AST  20  /  ALT  24  /  AlkPhos  30<L>      PT/INR - ( 07 Sep 2024 14:37 )   PT: 11.5 sec;   INR: 1.02 ratio       PTT - ( 07 Sep 2024 14:37 )  PTT:23.4 sec    Troponin I, High Sensitivity (24 @ 18:35): 12.78  Troponin I, High Sensitivity (24 @ 14:37): 13.50    BLOOD CULTURES:   LIPID PROFILE     RADIOLOGY:    CXR: 24:  INTERPRETATION:  AP chest on erect on 2024 at 3:03 PM. Patient has dizziness and weakness.  Heart size normal. Sternotomy is noted. Lungs are clear.  IMPRESSION: Sternotomy. No acute finding.    CT of Abd/Pelvis: 24:  FINDINGS:  LOWER CHEST: Within normal limits.  LIVER: Within normal limits.  BILE DUCTS: Normal caliber.  GALLBLADDER: Within normal limits.  SPLEEN: Within normal limits.  PANCREAS: Within normal limits.  ADRENALS: Within normal limits.  KIDNEYS/URETERS: Within normal limits.  BLADDER: Within normal limits.  REPRODUCTIVE ORGANS: Prostate is enlarged.  BOWEL: No bowel obstruction. Appendixis normal. No GI bleed.  PERITONEUM/RETROPERITONEUM: Within normal limits.  VESSELS: Normal caliber abdominal aorta with mild atherosclerotic calcification.  LYMPH NODES: No lymphadenopathy.  ABDOMINAL WALL: Within normal limits.  BONES: Within normal limits.  IMPRESSION: No active GI bleed is identified.    CT of Head: 24:  FINDINGS:  VENTRICLES AND SULCI: Age appropriate involutional changes.  INTRA-AXIAL: No mass effect, acute hemorrhage, or midline shift.  EXTRA-AXIAL: No mass or fluid collection. Basal cisterns are normal in appearance.  VISUALIZED SINUSES:  Clear.  TYMPANOMASTOID CAVITIES:  Clear.  VISUALIZED ORBITS: Normal.  CALVARIUM: Intact.  MISCELLANEOUS: None.  IMPRESSION: No evidence of acute intracranial pathology.      EK24:  Normal sinus rhythm  Nonspecific T wave abnormality      TELEMETRY:  NSR    PET Nuclear Stress Test: 24:  Normal, no obvious ischemia    ECHO:  Echo in my office on 24 showed normal LV size and systolic function with LVEF=60-65% with a mildly dilated aortic root (3.9-4.0 cm) with mild MR and TR but otherwise a normal study.    
HPI:  79-year-old male with a past medical history of CAD, status post CABG x 4, stent placement, on Effient and aspirin, high cholesterol presents with wife for episodes of dizziness, weakness, syncope.  Patient states when he woke up yesterday morning he felt a little dizzy going to the bathroom and had to lay down a few times after the episode started.  Patient also noticed having some diarrhea that started yesterday and the diarrhea was characterized as black and grainy texture.  Patient had 2 more episodes which were also black in color throughout the day.  Patient states that he went to open that the door at approximately 3 PM and had a syncopal episode where he fell down with questionable LOC for a few seconds. Patient spoke with the on-call cardiologist yesterday Dr. Dickey who informed him to come to the emergency room versus trying to hydrate at home COVID if it would help with his symptoms. This morning patient noticed still having similar episodes which prompted his arrival to the ED.  --------------  Last melena prior to admission  No h/o GI bleed    PAST MEDICAL & SURGICAL HISTORY:  CAD (coronary artery disease)      S/P CABG x 4          Home Medications:  Aspirin  Effient    MEDICATIONS  (STANDING):  pantoprazole Infusion 8 mG/Hr (10 mL/Hr) IV Continuous <Continuous>    MEDICATIONS  (PRN):      Allergies    Allergy Status Unknown    Intolerances        SOCIAL HISTORY:    FAMILY HISTORY:      ROS  As above  Otherwise unremarkable    Vital Signs Last 24 Hrs  T(C): 36.3 (07 Sep 2024 12:35), Max: 36.3 (07 Sep 2024 12:35)  T(F): 97.4 (07 Sep 2024 12:35), Max: 97.4 (07 Sep 2024 12:35)  HR: 77 (07 Sep 2024 12:56) (77 - 87)  BP: 103/73 (07 Sep 2024 12:56) (98/55 - 103/73)  BP(mean): 68 (07 Sep 2024 12:35) (68 - 68)  RR: 16 (07 Sep 2024 12:56) (16 - 16)  SpO2: 100% (07 Sep 2024 12:56) (100% - 100%)    Parameters below as of 07 Sep 2024 12:56  Patient On (Oxygen Delivery Method): room air        Constitutional: NAD, well-developed  Respiratory: CTAB  Cardiovascular: S1 and S2, RRR  Gastrointestinal: BS+, soft, NT/ND  Extremities: No peripheral edema  Psychiatric: Normal mood, normal affect  Skin: No rashes    LABS:                        9.9    6.54  )-----------( 161      ( 07 Sep 2024 14:37 )             29.3     09-07    137  |  109<H>  |  40<H>  ----------------------------<  113<H>  4.0   |  25  |  0.78    Ca    8.5      07 Sep 2024 14:37  Mg     2.2     09-07    TPro  5.8<L>  /  Alb  3.2<L>  /  TBili  0.3  /  DBili  x   /  AST  20  /  ALT  24  /  AlkPhos  30<L>  09-07    PT/INR - ( 07 Sep 2024 14:37 )   PT: 11.5 sec;   INR: 1.02 ratio         PTT - ( 07 Sep 2024 14:37 )  PTT:23.4 sec  LIVER FUNCTIONS - ( 07 Sep 2024 14:37 )  Alb: 3.2 g/dL / Pro: 5.8 gm/dL / ALK PHOS: 30 U/L / ALT: 24 U/L / AST: 20 U/L / GGT: x             RADIOLOGY & ADDITIONAL STUDIES:
today/shortly

## 2024-09-09 NOTE — DISCHARGE NOTE NURSING/CASE MANAGEMENT/SOCIAL WORK - NSDCPEFALRISK_GEN_ALL_CORE
For information on Fall & Injury Prevention, visit: [email or web address] OR  [email or web address] OR  https://www.cdc.gov/steadi/patient.html

## 2024-09-09 NOTE — DISCHARGE NOTE PROVIDER - NSDCCPCAREPLAN_GEN_ALL_CORE_FT
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

## 2024-09-10 LAB — SURGICAL PATHOLOGY STUDY: SIGNIFICANT CHANGE UP

## 2024-09-15 DIAGNOSIS — D68.32 HEMORRHAGIC DISORDER DUE TO EXTRINSIC CIRCULATING ANTICOAGULANTS: ICD-10-CM

## 2024-09-15 DIAGNOSIS — Z79.82 LONG TERM (CURRENT) USE OF ASPIRIN: ICD-10-CM

## 2024-09-15 DIAGNOSIS — N40.0 BENIGN PROSTATIC HYPERPLASIA WITHOUT LOWER URINARY TRACT SYMPTOMS: ICD-10-CM

## 2024-09-15 DIAGNOSIS — E78.00 PURE HYPERCHOLESTEROLEMIA, UNSPECIFIED: ICD-10-CM

## 2024-09-15 DIAGNOSIS — Z95.5 PRESENCE OF CORONARY ANGIOPLASTY IMPLANT AND GRAFT: ICD-10-CM

## 2024-09-15 DIAGNOSIS — K31.811 ANGIODYSPLASIA OF STOMACH AND DUODENUM WITH BLEEDING: ICD-10-CM

## 2024-09-15 DIAGNOSIS — Z79.899 OTHER LONG TERM (CURRENT) DRUG THERAPY: ICD-10-CM

## 2024-09-15 DIAGNOSIS — I34.0 NONRHEUMATIC MITRAL (VALVE) INSUFFICIENCY: ICD-10-CM

## 2024-09-15 DIAGNOSIS — I25.10 ATHEROSCLEROTIC HEART DISEASE OF NATIVE CORONARY ARTERY WITHOUT ANGINA PECTORIS: ICD-10-CM

## 2024-09-15 DIAGNOSIS — E86.0 DEHYDRATION: ICD-10-CM

## 2024-09-15 DIAGNOSIS — Z95.1 PRESENCE OF AORTOCORONARY BYPASS GRAFT: ICD-10-CM
